# Patient Record
Sex: FEMALE | Race: WHITE | NOT HISPANIC OR LATINO | Employment: OTHER | URBAN - METROPOLITAN AREA
[De-identification: names, ages, dates, MRNs, and addresses within clinical notes are randomized per-mention and may not be internally consistent; named-entity substitution may affect disease eponyms.]

---

## 2022-04-20 ENCOUNTER — HOSPITAL ENCOUNTER (INPATIENT)
Facility: MEDICAL CENTER | Age: 52
LOS: 4 days | DRG: 175 | End: 2022-04-24
Attending: EMERGENCY MEDICINE | Admitting: STUDENT IN AN ORGANIZED HEALTH CARE EDUCATION/TRAINING PROGRAM
Payer: MEDICAID

## 2022-04-20 ENCOUNTER — APPOINTMENT (OUTPATIENT)
Dept: RADIOLOGY | Facility: MEDICAL CENTER | Age: 52
DRG: 175 | End: 2022-04-20
Attending: EMERGENCY MEDICINE
Payer: MEDICAID

## 2022-04-20 DIAGNOSIS — R09.02 HYPOXEMIA: ICD-10-CM

## 2022-04-20 DIAGNOSIS — J96.01 ACUTE RESPIRATORY FAILURE WITH HYPOXIA (HCC): ICD-10-CM

## 2022-04-20 DIAGNOSIS — I26.94 MULTIPLE SUBSEGMENTAL PULMONARY EMBOLI WITHOUT ACUTE COR PULMONALE (HCC): ICD-10-CM

## 2022-04-20 DIAGNOSIS — I26.99 PULMONARY EMBOLISM AND INFARCTION (HCC): ICD-10-CM

## 2022-04-20 PROBLEM — E80.6 HYPERBILIRUBINEMIA: Status: ACTIVE | Noted: 2022-04-20

## 2022-04-20 PROBLEM — K74.60 CIRRHOSIS (HCC): Status: ACTIVE | Noted: 2022-04-20

## 2022-04-20 PROBLEM — J45.909 ASTHMA: Status: ACTIVE | Noted: 2022-04-20

## 2022-04-20 PROBLEM — E03.9 HYPOTHYROIDISM: Status: ACTIVE | Noted: 2022-04-20

## 2022-04-20 LAB
ALBUMIN SERPL BCP-MCNC: 3.5 G/DL (ref 3.2–4.9)
ALBUMIN/GLOB SERPL: 0.9 G/DL
ALP SERPL-CCNC: 152 U/L (ref 30–99)
ALT SERPL-CCNC: 21 U/L (ref 2–50)
ANION GAP SERPL CALC-SCNC: 13 MMOL/L (ref 7–16)
ANISOCYTOSIS BLD QL SMEAR: ABNORMAL
APTT PPP: 29.7 SEC (ref 24.7–36)
AST SERPL-CCNC: 27 U/L (ref 12–45)
BASOPHILS # BLD AUTO: 0.6 % (ref 0–1.8)
BASOPHILS # BLD: 0.04 K/UL (ref 0–0.12)
BILIRUB SERPL-MCNC: 2.7 MG/DL (ref 0.1–1.5)
BUN SERPL-MCNC: 28 MG/DL (ref 8–22)
BURR CELLS BLD QL SMEAR: NORMAL
CALCIUM SERPL-MCNC: 8.8 MG/DL (ref 8.5–10.5)
CHLORIDE SERPL-SCNC: 98 MMOL/L (ref 96–112)
CO2 SERPL-SCNC: 20 MMOL/L (ref 20–33)
COMMENT 1642: NORMAL
CREAT SERPL-MCNC: 0.96 MG/DL (ref 0.5–1.4)
D DIMER PPP IA.FEU-MCNC: 12.56 UG/ML (FEU) (ref 0–0.5)
EKG IMPRESSION: NORMAL
EOSINOPHIL # BLD AUTO: 0.01 K/UL (ref 0–0.51)
EOSINOPHIL NFR BLD: 0.1 % (ref 0–6.9)
ERYTHROCYTE [DISTWIDTH] IN BLOOD BY AUTOMATED COUNT: 58.2 FL (ref 35.9–50)
GFR SERPLBLD CREATININE-BSD FMLA CKD-EPI: 71 ML/MIN/1.73 M 2
GLOBULIN SER CALC-MCNC: 4 G/DL (ref 1.9–3.5)
GLUCOSE SERPL-MCNC: 111 MG/DL (ref 65–99)
HCT VFR BLD AUTO: 43.1 % (ref 37–47)
HGB BLD-MCNC: 12.3 G/DL (ref 12–16)
HYPOCHROMIA BLD QL SMEAR: ABNORMAL
IMM GRANULOCYTES # BLD AUTO: 0.03 K/UL (ref 0–0.11)
IMM GRANULOCYTES NFR BLD AUTO: 0.4 % (ref 0–0.9)
INR PPP: 1.59 (ref 0.87–1.13)
LYMPHOCYTES # BLD AUTO: 0.89 K/UL (ref 1–4.8)
LYMPHOCYTES NFR BLD: 13 % (ref 22–41)
MACROCYTES BLD QL SMEAR: ABNORMAL
MAGNESIUM SERPL-MCNC: 1.6 MG/DL (ref 1.5–2.5)
MCH RBC QN AUTO: 21 PG (ref 27–33)
MCHC RBC AUTO-ENTMCNC: 28.5 G/DL (ref 33.6–35)
MCV RBC AUTO: 73.5 FL (ref 81.4–97.8)
MONOCYTES # BLD AUTO: 0.75 K/UL (ref 0–0.85)
MONOCYTES NFR BLD AUTO: 10.9 % (ref 0–13.4)
MORPHOLOGY BLD-IMP: NORMAL
NEUTROPHILS # BLD AUTO: 5.14 K/UL (ref 2–7.15)
NEUTROPHILS NFR BLD: 75 % (ref 44–72)
NRBC # BLD AUTO: 0.04 K/UL
NRBC BLD-RTO: 0.6 /100 WBC
NT-PROBNP SERPL IA-MCNC: 3470 PG/ML (ref 0–125)
OVALOCYTES BLD QL SMEAR: NORMAL
PLATELET # BLD AUTO: 189 K/UL (ref 164–446)
PLATELET BLD QL SMEAR: NORMAL
POIKILOCYTOSIS BLD QL SMEAR: NORMAL
POLYCHROMASIA BLD QL SMEAR: NORMAL
POTASSIUM SERPL-SCNC: 4.4 MMOL/L (ref 3.6–5.5)
PROCALCITONIN SERPL-MCNC: 0.14 NG/ML
PROT SERPL-MCNC: 7.5 G/DL (ref 6–8.2)
PROTHROMBIN TIME: 18.5 SEC (ref 12–14.6)
RBC # BLD AUTO: 5.86 M/UL (ref 4.2–5.4)
RBC BLD AUTO: PRESENT
SODIUM SERPL-SCNC: 131 MMOL/L (ref 135–145)
TROPONIN T SERPL-MCNC: 25 NG/L (ref 6–19)
UFH PPP CHRO-ACNC: <0.1 IU/ML
WBC # BLD AUTO: 6.9 K/UL (ref 4.8–10.8)

## 2022-04-20 PROCEDURE — 770020 HCHG ROOM/CARE - TELE (206)

## 2022-04-20 PROCEDURE — 96368 THER/DIAG CONCURRENT INF: CPT

## 2022-04-20 PROCEDURE — 700111 HCHG RX REV CODE 636 W/ 250 OVERRIDE (IP): Performed by: STUDENT IN AN ORGANIZED HEALTH CARE EDUCATION/TRAINING PROGRAM

## 2022-04-20 PROCEDURE — 93005 ELECTROCARDIOGRAM TRACING: CPT

## 2022-04-20 PROCEDURE — 700117 HCHG RX CONTRAST REV CODE 255: Performed by: EMERGENCY MEDICINE

## 2022-04-20 PROCEDURE — A9270 NON-COVERED ITEM OR SERVICE: HCPCS | Performed by: STUDENT IN AN ORGANIZED HEALTH CARE EDUCATION/TRAINING PROGRAM

## 2022-04-20 PROCEDURE — 93005 ELECTROCARDIOGRAM TRACING: CPT | Performed by: EMERGENCY MEDICINE

## 2022-04-20 PROCEDURE — 96365 THER/PROPH/DIAG IV INF INIT: CPT

## 2022-04-20 PROCEDURE — 71045 X-RAY EXAM CHEST 1 VIEW: CPT

## 2022-04-20 PROCEDURE — 71275 CT ANGIOGRAPHY CHEST: CPT

## 2022-04-20 PROCEDURE — 85025 COMPLETE CBC W/AUTO DIFF WBC: CPT

## 2022-04-20 PROCEDURE — 700102 HCHG RX REV CODE 250 W/ 637 OVERRIDE(OP): Performed by: STUDENT IN AN ORGANIZED HEALTH CARE EDUCATION/TRAINING PROGRAM

## 2022-04-20 PROCEDURE — 99223 1ST HOSP IP/OBS HIGH 75: CPT | Performed by: STUDENT IN AN ORGANIZED HEALTH CARE EDUCATION/TRAINING PROGRAM

## 2022-04-20 PROCEDURE — 85610 PROTHROMBIN TIME: CPT

## 2022-04-20 PROCEDURE — 83880 ASSAY OF NATRIURETIC PEPTIDE: CPT

## 2022-04-20 PROCEDURE — 96375 TX/PRO/DX INJ NEW DRUG ADDON: CPT

## 2022-04-20 PROCEDURE — 84145 PROCALCITONIN (PCT): CPT

## 2022-04-20 PROCEDURE — 36415 COLL VENOUS BLD VENIPUNCTURE: CPT

## 2022-04-20 PROCEDURE — 85379 FIBRIN DEGRADATION QUANT: CPT

## 2022-04-20 PROCEDURE — 85730 THROMBOPLASTIN TIME PARTIAL: CPT

## 2022-04-20 PROCEDURE — 74176 CT ABD & PELVIS W/O CONTRAST: CPT

## 2022-04-20 PROCEDURE — 85520 HEPARIN ASSAY: CPT

## 2022-04-20 PROCEDURE — 83735 ASSAY OF MAGNESIUM: CPT

## 2022-04-20 PROCEDURE — 99285 EMERGENCY DEPT VISIT HI MDM: CPT

## 2022-04-20 PROCEDURE — 80053 COMPREHEN METABOLIC PANEL: CPT

## 2022-04-20 PROCEDURE — 84484 ASSAY OF TROPONIN QUANT: CPT

## 2022-04-20 PROCEDURE — 82962 GLUCOSE BLOOD TEST: CPT

## 2022-04-20 RX ORDER — HEPARIN SODIUM 1000 [USP'U]/ML
40 INJECTION, SOLUTION INTRAVENOUS; SUBCUTANEOUS PRN
Status: DISCONTINUED | OUTPATIENT
Start: 2022-04-20 | End: 2022-04-20

## 2022-04-20 RX ORDER — HYDRALAZINE HYDROCHLORIDE 20 MG/ML
10 INJECTION INTRAMUSCULAR; INTRAVENOUS EVERY 4 HOURS PRN
Status: DISCONTINUED | OUTPATIENT
Start: 2022-04-20 | End: 2022-04-24 | Stop reason: HOSPADM

## 2022-04-20 RX ORDER — ACETAMINOPHEN 325 MG/1
650 TABLET ORAL EVERY 6 HOURS PRN
Status: DISCONTINUED | OUTPATIENT
Start: 2022-04-20 | End: 2022-04-24 | Stop reason: HOSPADM

## 2022-04-20 RX ORDER — GUAIFENESIN/DEXTROMETHORPHAN 100-10MG/5
10 SYRUP ORAL EVERY 6 HOURS PRN
Status: DISCONTINUED | OUTPATIENT
Start: 2022-04-20 | End: 2022-04-24 | Stop reason: HOSPADM

## 2022-04-20 RX ORDER — ONDANSETRON 4 MG/1
4 TABLET, ORALLY DISINTEGRATING ORAL EVERY 4 HOURS PRN
Status: DISCONTINUED | OUTPATIENT
Start: 2022-04-20 | End: 2022-04-24 | Stop reason: HOSPADM

## 2022-04-20 RX ORDER — PROMETHAZINE HYDROCHLORIDE 25 MG/1
12.5-25 TABLET ORAL EVERY 4 HOURS PRN
Status: DISCONTINUED | OUTPATIENT
Start: 2022-04-20 | End: 2022-04-24 | Stop reason: HOSPADM

## 2022-04-20 RX ORDER — HEPARIN SODIUM 1000 [USP'U]/ML
80 INJECTION, SOLUTION INTRAVENOUS; SUBCUTANEOUS ONCE
Status: COMPLETED | OUTPATIENT
Start: 2022-04-20 | End: 2022-04-20

## 2022-04-20 RX ORDER — HEPARIN SODIUM 5000 [USP'U]/100ML
0-30 INJECTION, SOLUTION INTRAVENOUS CONTINUOUS
Status: DISCONTINUED | OUTPATIENT
Start: 2022-04-20 | End: 2022-04-20

## 2022-04-20 RX ORDER — MAGNESIUM SULFATE HEPTAHYDRATE 40 MG/ML
2 INJECTION, SOLUTION INTRAVENOUS ONCE
Status: COMPLETED | OUTPATIENT
Start: 2022-04-20 | End: 2022-04-21

## 2022-04-20 RX ORDER — DEXTROSE MONOHYDRATE 25 G/50ML
25 INJECTION, SOLUTION INTRAVENOUS
Status: DISCONTINUED | OUTPATIENT
Start: 2022-04-20 | End: 2022-04-21

## 2022-04-20 RX ORDER — FUROSEMIDE 10 MG/ML
40 INJECTION INTRAMUSCULAR; INTRAVENOUS
Status: DISCONTINUED | OUTPATIENT
Start: 2022-04-21 | End: 2022-04-24 | Stop reason: HOSPADM

## 2022-04-20 RX ORDER — AMOXICILLIN 250 MG
2 CAPSULE ORAL 2 TIMES DAILY
Status: DISCONTINUED | OUTPATIENT
Start: 2022-04-20 | End: 2022-04-24 | Stop reason: HOSPADM

## 2022-04-20 RX ORDER — HEPARIN SODIUM 1000 [USP'U]/ML
40 INJECTION, SOLUTION INTRAVENOUS; SUBCUTANEOUS PRN
Status: DISCONTINUED | OUTPATIENT
Start: 2022-04-20 | End: 2022-04-21

## 2022-04-20 RX ORDER — HEPARIN SODIUM 5000 [USP'U]/100ML
0-30 INJECTION, SOLUTION INTRAVENOUS CONTINUOUS
Status: DISCONTINUED | OUTPATIENT
Start: 2022-04-20 | End: 2022-04-21

## 2022-04-20 RX ORDER — POLYETHYLENE GLYCOL 3350 17 G/17G
1 POWDER, FOR SOLUTION ORAL
Status: DISCONTINUED | OUTPATIENT
Start: 2022-04-20 | End: 2022-04-24 | Stop reason: HOSPADM

## 2022-04-20 RX ORDER — PROCHLORPERAZINE EDISYLATE 5 MG/ML
5-10 INJECTION INTRAMUSCULAR; INTRAVENOUS EVERY 4 HOURS PRN
Status: DISCONTINUED | OUTPATIENT
Start: 2022-04-20 | End: 2022-04-24 | Stop reason: HOSPADM

## 2022-04-20 RX ORDER — TRAMADOL HYDROCHLORIDE 50 MG/1
50 TABLET ORAL EVERY 6 HOURS PRN
Status: DISCONTINUED | OUTPATIENT
Start: 2022-04-20 | End: 2022-04-21

## 2022-04-20 RX ORDER — ONDANSETRON 2 MG/ML
4 INJECTION INTRAMUSCULAR; INTRAVENOUS EVERY 4 HOURS PRN
Status: DISCONTINUED | OUTPATIENT
Start: 2022-04-20 | End: 2022-04-24 | Stop reason: HOSPADM

## 2022-04-20 RX ORDER — LEVOTHYROXINE SODIUM 0.05 MG/1
50 TABLET ORAL DAILY
Status: DISCONTINUED | OUTPATIENT
Start: 2022-04-21 | End: 2022-04-20

## 2022-04-20 RX ORDER — IPRATROPIUM BROMIDE AND ALBUTEROL SULFATE 2.5; .5 MG/3ML; MG/3ML
3 SOLUTION RESPIRATORY (INHALATION)
Status: DISCONTINUED | OUTPATIENT
Start: 2022-04-20 | End: 2022-04-24 | Stop reason: HOSPADM

## 2022-04-20 RX ORDER — ACETAMINOPHEN 500 MG
500-1000 TABLET ORAL EVERY 6 HOURS PRN
Status: ON HOLD | COMMUNITY
End: 2022-04-24

## 2022-04-20 RX ORDER — BISACODYL 10 MG
10 SUPPOSITORY, RECTAL RECTAL
Status: DISCONTINUED | OUTPATIENT
Start: 2022-04-20 | End: 2022-04-24 | Stop reason: HOSPADM

## 2022-04-20 RX ORDER — PROMETHAZINE HYDROCHLORIDE 25 MG/1
12.5-25 SUPPOSITORY RECTAL EVERY 4 HOURS PRN
Status: DISCONTINUED | OUTPATIENT
Start: 2022-04-20 | End: 2022-04-24 | Stop reason: HOSPADM

## 2022-04-20 RX ORDER — ATORVASTATIN CALCIUM 20 MG/1
20 TABLET, FILM COATED ORAL EVERY EVENING
Status: DISCONTINUED | OUTPATIENT
Start: 2022-04-20 | End: 2022-04-24 | Stop reason: HOSPADM

## 2022-04-20 RX ADMIN — HEPARIN SODIUM 7000 UNITS: 1000 INJECTION, SOLUTION INTRAVENOUS; SUBCUTANEOUS at 21:59

## 2022-04-20 RX ADMIN — HEPARIN SODIUM 18 UNITS/KG/HR: 5000 INJECTION, SOLUTION INTRAVENOUS at 21:58

## 2022-04-20 RX ADMIN — MAGNESIUM SULFATE HEPTAHYDRATE 2 G: 40 INJECTION, SOLUTION INTRAVENOUS at 22:03

## 2022-04-20 RX ADMIN — IOHEXOL 50 ML: 350 INJECTION, SOLUTION INTRAVENOUS at 20:45

## 2022-04-20 RX ADMIN — ATORVASTATIN CALCIUM 20 MG: 20 TABLET, FILM COATED ORAL at 23:14

## 2022-04-20 ASSESSMENT — ENCOUNTER SYMPTOMS
COUGH: 0
NECK PAIN: 0
SPUTUM PRODUCTION: 0
VOMITING: 0
SPEECH CHANGE: 0
BLURRED VISION: 0
FOCAL WEAKNESS: 0
ABDOMINAL PAIN: 0
HEARTBURN: 0
HEADACHES: 0
FALLS: 0
SHORTNESS OF BREATH: 1
PALPITATIONS: 0
CHILLS: 0
BRUISES/BLEEDS EASILY: 0
DEPRESSION: 0
ROS GI COMMENTS: ABDOMINAL BLOATING
DIZZINESS: 0
DOUBLE VISION: 0
FEVER: 0
NAUSEA: 0
FLANK PAIN: 0
MYALGIAS: 0

## 2022-04-20 ASSESSMENT — LIFESTYLE VARIABLES
EVER FELT BAD OR GUILTY ABOUT YOUR DRINKING: NO
CONSUMPTION TOTAL: NEGATIVE
HAVE PEOPLE ANNOYED YOU BY CRITICIZING YOUR DRINKING: NO
TOTAL SCORE: 0
HOW MANY TIMES IN THE PAST YEAR HAVE YOU HAD 5 OR MORE DRINKS IN A DAY: 0
ALCOHOL_USE: YES
TOTAL SCORE: 0
EVER HAD A DRINK FIRST THING IN THE MORNING TO STEADY YOUR NERVES TO GET RID OF A HANGOVER: NO
HAVE YOU EVER FELT YOU SHOULD CUT DOWN ON YOUR DRINKING: NO
AVERAGE NUMBER OF DAYS PER WEEK YOU HAVE A DRINK CONTAINING ALCOHOL: 0
TOTAL SCORE: 0
SUBSTANCE_ABUSE: 0
DOES PATIENT WANT TO STOP DRINKING: NO
ON A TYPICAL DAY WHEN YOU DRINK ALCOHOL HOW MANY DRINKS DO YOU HAVE: 2

## 2022-04-20 ASSESSMENT — COGNITIVE AND FUNCTIONAL STATUS - GENERAL
DRESSING REGULAR LOWER BODY CLOTHING: A LITTLE
SUGGESTED CMS G CODE MODIFIER DAILY ACTIVITY: CJ
DAILY ACTIVITIY SCORE: 22
MOBILITY SCORE: 23
MOVING TO AND FROM BED TO CHAIR: A LITTLE
HELP NEEDED FOR BATHING: A LITTLE
SUGGESTED CMS G CODE MODIFIER MOBILITY: CI

## 2022-04-20 ASSESSMENT — PAIN DESCRIPTION - PAIN TYPE: TYPE: ACUTE PAIN;CHRONIC PAIN

## 2022-04-20 ASSESSMENT — FIBROSIS 4 INDEX: FIB4 SCORE: 1.62

## 2022-04-21 ENCOUNTER — APPOINTMENT (OUTPATIENT)
Dept: CARDIOLOGY | Facility: MEDICAL CENTER | Age: 52
DRG: 175 | End: 2022-04-21
Attending: INTERNAL MEDICINE
Payer: MEDICAID

## 2022-04-21 PROBLEM — K76.0 HEPATIC STEATOSIS: Status: ACTIVE | Noted: 2022-04-21

## 2022-04-21 PROBLEM — K74.60 CIRRHOSIS (HCC): Status: RESOLVED | Noted: 2022-04-20 | Resolved: 2022-04-21

## 2022-04-21 PROBLEM — K74.00 HEPATIC FIBROSIS: Status: ACTIVE | Noted: 2022-04-21

## 2022-04-21 PROBLEM — K76.0 HEPATIC STEATOSIS: Status: RESOLVED | Noted: 2022-04-21 | Resolved: 2022-04-21

## 2022-04-21 PROBLEM — I50.9 DECOMPENSATED HEART FAILURE (HCC): Status: ACTIVE | Noted: 2022-04-21

## 2022-04-21 LAB
ALBUMIN SERPL BCP-MCNC: 3.5 G/DL (ref 3.2–4.9)
ALBUMIN/GLOB SERPL: 0.9 G/DL
ALP SERPL-CCNC: 149 U/L (ref 30–99)
ALT SERPL-CCNC: 20 U/L (ref 2–50)
ANION GAP SERPL CALC-SCNC: 14 MMOL/L (ref 7–16)
AST SERPL-CCNC: 26 U/L (ref 12–45)
BASOPHILS # BLD AUTO: 0.2 % (ref 0–1.8)
BASOPHILS # BLD: 0.02 K/UL (ref 0–0.12)
BILIRUB CONJ SERPL-MCNC: 1.3 MG/DL (ref 0.1–0.5)
BILIRUB SERPL-MCNC: 2.5 MG/DL (ref 0.1–1.5)
BUN SERPL-MCNC: 30 MG/DL (ref 8–22)
CALCIUM SERPL-MCNC: 8.6 MG/DL (ref 8.5–10.5)
CHLORIDE SERPL-SCNC: 101 MMOL/L (ref 96–112)
CHOLEST SERPL-MCNC: 64 MG/DL (ref 100–199)
CO2 SERPL-SCNC: 20 MMOL/L (ref 20–33)
CREAT SERPL-MCNC: 1.02 MG/DL (ref 0.5–1.4)
EOSINOPHIL # BLD AUTO: 0.01 K/UL (ref 0–0.51)
EOSINOPHIL NFR BLD: 0.1 % (ref 0–6.9)
ERYTHROCYTE [DISTWIDTH] IN BLOOD BY AUTOMATED COUNT: 56.7 FL (ref 35.9–50)
EST. AVERAGE GLUCOSE BLD GHB EST-MCNC: 117 MG/DL
GFR SERPLBLD CREATININE-BSD FMLA CKD-EPI: 66 ML/MIN/1.73 M 2
GLOBULIN SER CALC-MCNC: 3.8 G/DL (ref 1.9–3.5)
GLUCOSE BLD STRIP.AUTO-MCNC: 111 MG/DL (ref 65–99)
GLUCOSE BLD STRIP.AUTO-MCNC: 99 MG/DL (ref 65–99)
GLUCOSE SERPL-MCNC: 121 MG/DL (ref 65–99)
HBA1C MFR BLD: 5.7 % (ref 4–5.6)
HCT VFR BLD AUTO: 42.1 % (ref 37–47)
HDLC SERPL-MCNC: 19 MG/DL
HGB BLD-MCNC: 12.3 G/DL (ref 12–16)
IMM GRANULOCYTES # BLD AUTO: 0.02 K/UL (ref 0–0.11)
IMM GRANULOCYTES NFR BLD AUTO: 0.2 % (ref 0–0.9)
LDLC SERPL CALC-MCNC: 29 MG/DL
LV EJECT FRACT  99904: 55
LV EJECT FRACT MOD 2C 99903: 56.82
LV EJECT FRACT MOD 4C 99902: 59.2
LV EJECT FRACT MOD BP 99901: 60.74
LYMPHOCYTES # BLD AUTO: 1.17 K/UL (ref 1–4.8)
LYMPHOCYTES NFR BLD: 14.1 % (ref 22–41)
MAGNESIUM SERPL-MCNC: 1.8 MG/DL (ref 1.5–2.5)
MCH RBC QN AUTO: 21.2 PG (ref 27–33)
MCHC RBC AUTO-ENTMCNC: 29.2 G/DL (ref 33.6–35)
MCV RBC AUTO: 72.7 FL (ref 81.4–97.8)
MONOCYTES # BLD AUTO: 0.96 K/UL (ref 0–0.85)
MONOCYTES NFR BLD AUTO: 11.6 % (ref 0–13.4)
NEUTROPHILS # BLD AUTO: 6.09 K/UL (ref 2–7.15)
NEUTROPHILS NFR BLD: 73.8 % (ref 44–72)
NRBC # BLD AUTO: 0.03 K/UL
NRBC BLD-RTO: 0.4 /100 WBC
PHOSPHATE SERPL-MCNC: 3.9 MG/DL (ref 2.5–4.5)
PLATELET # BLD AUTO: 142 K/UL (ref 164–446)
PLATELET # BLD AUTO: 151 K/UL (ref 164–446)
POTASSIUM SERPL-SCNC: 4.2 MMOL/L (ref 3.6–5.5)
PROT SERPL-MCNC: 7.3 G/DL (ref 6–8.2)
RBC # BLD AUTO: 5.79 M/UL (ref 4.2–5.4)
SODIUM SERPL-SCNC: 135 MMOL/L (ref 135–145)
TRIGL SERPL-MCNC: 78 MG/DL (ref 0–149)
TSH SERPL DL<=0.005 MIU/L-ACNC: 3.67 UIU/ML (ref 0.38–5.33)
UFH PPP CHRO-ACNC: 0.18 IU/ML
UFH PPP CHRO-ACNC: 0.41 IU/ML
UFH PPP CHRO-ACNC: 0.51 IU/ML
UFH PPP CHRO-ACNC: 0.51 IU/ML
WBC # BLD AUTO: 8.3 K/UL (ref 4.8–10.8)

## 2022-04-21 PROCEDURE — 84450 TRANSFERASE (AST) (SGOT): CPT

## 2022-04-21 PROCEDURE — 83036 HEMOGLOBIN GLYCOSYLATED A1C: CPT

## 2022-04-21 PROCEDURE — 93306 TTE W/DOPPLER COMPLETE: CPT

## 2022-04-21 PROCEDURE — 84443 ASSAY THYROID STIM HORMONE: CPT

## 2022-04-21 PROCEDURE — 700111 HCHG RX REV CODE 636 W/ 250 OVERRIDE (IP): Performed by: STUDENT IN AN ORGANIZED HEALTH CARE EDUCATION/TRAINING PROGRAM

## 2022-04-21 PROCEDURE — 85025 COMPLETE CBC W/AUTO DIFF WBC: CPT

## 2022-04-21 PROCEDURE — 99232 SBSQ HOSP IP/OBS MODERATE 35: CPT | Mod: GC | Performed by: INTERNAL MEDICINE

## 2022-04-21 PROCEDURE — RXMED WILLOW AMBULATORY MEDICATION CHARGE: Performed by: STUDENT IN AN ORGANIZED HEALTH CARE EDUCATION/TRAINING PROGRAM

## 2022-04-21 PROCEDURE — 80053 COMPREHEN METABOLIC PANEL: CPT

## 2022-04-21 PROCEDURE — 700102 HCHG RX REV CODE 250 W/ 637 OVERRIDE(OP): Performed by: STUDENT IN AN ORGANIZED HEALTH CARE EDUCATION/TRAINING PROGRAM

## 2022-04-21 PROCEDURE — 83883 ASSAY NEPHELOMETRY NOT SPEC: CPT

## 2022-04-21 PROCEDURE — A9270 NON-COVERED ITEM OR SERVICE: HCPCS | Performed by: STUDENT IN AN ORGANIZED HEALTH CARE EDUCATION/TRAINING PROGRAM

## 2022-04-21 PROCEDURE — 770020 HCHG ROOM/CARE - TELE (206)

## 2022-04-21 PROCEDURE — 83735 ASSAY OF MAGNESIUM: CPT

## 2022-04-21 PROCEDURE — 84460 ALANINE AMINO (ALT) (SGPT): CPT

## 2022-04-21 PROCEDURE — 85520 HEPARIN ASSAY: CPT | Mod: 91

## 2022-04-21 PROCEDURE — 36415 COLL VENOUS BLD VENIPUNCTURE: CPT

## 2022-04-21 PROCEDURE — 82977 ASSAY OF GGT: CPT

## 2022-04-21 PROCEDURE — 85049 AUTOMATED PLATELET COUNT: CPT

## 2022-04-21 PROCEDURE — 82248 BILIRUBIN DIRECT: CPT

## 2022-04-21 PROCEDURE — 82962 GLUCOSE BLOOD TEST: CPT

## 2022-04-21 PROCEDURE — 84520 ASSAY OF UREA NITROGEN: CPT

## 2022-04-21 PROCEDURE — 84100 ASSAY OF PHOSPHORUS: CPT

## 2022-04-21 PROCEDURE — 93306 TTE W/DOPPLER COMPLETE: CPT | Mod: 26 | Performed by: INTERNAL MEDICINE

## 2022-04-21 PROCEDURE — 80061 LIPID PANEL: CPT

## 2022-04-21 RX ORDER — RIVAROXABAN 15 MG-20MG
1 KIT ORAL DAILY
Qty: 51 EACH | Refills: 0 | Status: SHIPPED | OUTPATIENT
Start: 2022-04-21

## 2022-04-21 RX ORDER — ECHINACEA PURPUREA EXTRACT 125 MG
2 TABLET ORAL
Status: DISCONTINUED | OUTPATIENT
Start: 2022-04-21 | End: 2022-04-24 | Stop reason: HOSPADM

## 2022-04-21 RX ORDER — QUETIAPINE FUMARATE 25 MG/1
25 TABLET, FILM COATED ORAL NIGHTLY PRN
Status: DISCONTINUED | OUTPATIENT
Start: 2022-04-21 | End: 2022-04-24 | Stop reason: HOSPADM

## 2022-04-21 RX ADMIN — SENNOSIDES AND DOCUSATE SODIUM 2 TABLET: 50; 8.6 TABLET ORAL at 16:45

## 2022-04-21 RX ADMIN — FUROSEMIDE 40 MG: 10 INJECTION, SOLUTION INTRAMUSCULAR; INTRAVENOUS at 05:27

## 2022-04-21 RX ADMIN — RIVAROXABAN 15 MG: 15 TABLET, FILM COATED ORAL at 16:45

## 2022-04-21 RX ADMIN — HEPARIN SODIUM 3500 UNITS: 1000 INJECTION, SOLUTION INTRAVENOUS; SUBCUTANEOUS at 06:21

## 2022-04-21 RX ADMIN — HEPARIN SODIUM 20 UNITS/KG/HR: 5000 INJECTION, SOLUTION INTRAVENOUS at 13:02

## 2022-04-21 RX ADMIN — ACETAMINOPHEN 650 MG: 325 TABLET, FILM COATED ORAL at 08:37

## 2022-04-21 RX ADMIN — QUETIAPINE FUMARATE 25 MG: 25 TABLET ORAL at 22:44

## 2022-04-21 RX ADMIN — ATORVASTATIN CALCIUM 20 MG: 20 TABLET, FILM COATED ORAL at 16:44

## 2022-04-21 ASSESSMENT — ENCOUNTER SYMPTOMS
SHORTNESS OF BREATH: 1
CHILLS: 0
WHEEZING: 0
DOUBLE VISION: 0
WEIGHT LOSS: 0
FEVER: 1
HEMOPTYSIS: 0
BLURRED VISION: 0
EYES NEGATIVE: 1
MUSCULOSKELETAL NEGATIVE: 1
COUGH: 1
ABDOMINAL PAIN: 0
ORTHOPNEA: 1
PALPITATIONS: 0
ABDOMINAL PAIN: 1
VOMITING: 0
BLOOD IN STOOL: 0
BRUISES/BLEEDS EASILY: 0
PSYCHIATRIC NEGATIVE: 1
COUGH: 0
HEADACHES: 1
SPUTUM PRODUCTION: 0
NAUSEA: 0
FEVER: 0

## 2022-04-21 ASSESSMENT — PAIN DESCRIPTION - PAIN TYPE
TYPE: ACUTE PAIN
TYPE: ACUTE PAIN

## 2022-04-21 NOTE — ED TRIAGE NOTES
"Chief Complaint   Patient presents with   • Shortness of Breath     X 'few months', hx COPD but SOB worse now, pt has rx for home O2 but does not have any r/t insurance, denies cardiac hx or chest pain, states chronic cough with minimal clear phlegm, denies fevers   • Abdominal Swelling     Pt states abd distension X 3-4 months, denies hx of such, denies liver hx, states some mild constipation, post-menopausal with tubal ligation, denies urinary or stool changes, intermittent nausea but no vomiting      Pt to triage in WC for above complaints, VSS on 6L NC, pt was 70% on RA at , GCS 15, NAD.    Pt returned to lobby. Educated on triage process and to inform staff of any changes.     /107   Pulse 98   Temp 36.6 °C (97.9 °F) (Temporal)   Resp 18   Ht 1.575 m (5' 2\")   Wt (!) 143 kg (315 lb)   SpO2 100%   BMI 57.61 kg/m²     "

## 2022-04-21 NOTE — ASSESSMENT & PLAN NOTE
Likely decompensated right heart failure due to pulmonary embolism..  CTA suggest pulmonary hypertension and congestive hepatopathy, with orthopnea.  Echo shows severely dilated right ventricle with RVSP of 105 mmHg.  -Cautious diuresis with Lasix due to high RVSP.

## 2022-04-21 NOTE — CARE PLAN
The patient is Watcher - Medium risk of patient condition declining or worsening    Shift Goals  Clinical Goals: Monitor O2 & heparin gtt  Patient Goals: Sleep  Family Goals: CAT    Progress made toward(s) clinical / shift goals:        Problem: Knowledge Deficit - Standard  Goal: Patient and family/care givers will demonstrate understanding of plan of care, disease process/condition, diagnostic tests and medications  Outcome: Progressing  Note: Patient verbally demonstrates understanding of POC and disease process      Problem: Pain - Standard  Goal: Alleviation of pain or a reduction in pain to the patient’s comfort goal  Outcome: Progressing  Note: Patient reports the tylenol given for her headache worked well. Utilizing 0-10 pain scale.

## 2022-04-21 NOTE — ED NOTES
Report from ROB Guerrero. Assumed care of pt, pt sitting up in gurney, oxymask on. Able to speak in complete sentences, ERP at bedside.

## 2022-04-21 NOTE — PROGRESS NOTES
Assumed care of patient, received bedside report from NOC RN. Patient is A&O X 4. Pain 3/10, headache, refer to MAR. Vital signs stable overnight, 5L NC, satting 94%. On tele monitor, SR 99. POC discussed with patient and she verbalized understanding. Call light within reach and fall precautions in place. Bed locked and in lowest position.

## 2022-04-21 NOTE — PROGRESS NOTES
Daily Progress Note:     Date of Service: 4/21/2022  Primary Team: [facility] Team   Attending: SHERMAN Rodrigues   Senior Resident: Dr. Wheeler  Intern: Dr. Salazar  Contact:  [phone]    Chief Complaint:   Shortness of Breath    Subjective: 51 y.o F w/o consistent medical care presented with progressive SOB and abdominal swelling x 2-3 months. On presentation to ED, patient was found to be hypoxic with 77% on RA, evaluation revealed multiple segmental and subsegmental PE, right middle lobe and left lower lobe subsegmental pulmonary blood, asymmetric enlargement of the right ventricle  (4.5cm vs 2.9cm for left ventricle). Also with findings of ascites along the left abdominal wall, hepatomegaly with regular hepatic contour favoring changes of cirrhosis.   Heparin drip and lasix started.     Patient reports improvement in her symptoms this AM, reports that she is frustrated as she was not able to sleep due to discomfort with IV placement and noises in the environment. Patient reports that she has not received any medical care for the past 3-5 years due to non-insured status. She reports history of asthma, previously uses albuterol and supplemental oxygen however, she has been using it for about a year.     Patient reports that her SOB has been progressive over the last 2-3 months. She is unable to walk for more than a few steps, prior was able to walk for several blocks. Reports that she is unable to sleep lying down, although that has been for several years. Patient denies known history of heart failure, blood clot, malignancies. She denies any family history of blood clots. She is a  with [employer] and says that she does not often ambulate between her trips.     Patient denies any OCP use, has one-two drinks/month, denies tobacco or recreational drug use    Consultants/Specialty:    Review of Systems   Constitutional: Negative for chills, fever and weight loss.   Eyes: Negative for blurred vision and  double vision.   Respiratory: Positive for cough and shortness of breath. Negative for hemoptysis, sputum production and wheezing.    Cardiovascular: Positive for orthopnea and leg swelling. Negative for chest pain and palpitations.   Gastrointestinal: Negative for abdominal pain and blood in stool.   Endo/Heme/Allergies: Does not bruise/bleed easily.       Objective Data:     Physical Exam:     Vitals:   Temp:  [36.4 °C (97.6 °F)-36.8 °C (98.3 °F)] 36.4 °C (97.6 °F)  Pulse:  [71-98] 79  Resp:  [16-24] 18  BP: (113-166)/() 128/91  SpO2:  [70 %-100 %] 90 %    Physical Exam  Constitutional:       General: She is not in acute distress.     Appearance: She is obese. She is not ill-appearing.   HENT:      Head: Normocephalic and atraumatic.      Nose: Nose normal. No congestion.      Mouth/Throat:      Mouth: Mucous membranes are moist.   Eyes:      General: No scleral icterus.     Extraocular Movements: Extraocular movements intact.      Pupils: Pupils are equal, round, and reactive to light.   Cardiovascular:      Rate and Rhythm: Normal rate and regular rhythm.      Pulses: Normal pulses.      Heart sounds: Normal heart sounds. No murmur heard.  Pulmonary:      Effort: No respiratory distress.      Comments: Decreased breath sounds in lower lung fields  95% on 5L N/C, desats to 82% when lying flat  Chest:      Chest wall: No tenderness.   Abdominal:      General: There is distension.      Comments: 3+ pitting edema of the abdomen, erythema in the epigastric area, without any wound or discharge  No spider nevi or caput medusae    Musculoskeletal:         General: Normal range of motion.      Cervical back: Normal range of motion and neck supple.   Skin:     General: Skin is warm and dry.   Neurological:      General: No focal deficit present.      Mental Status: She is oriented to person, place, and time.      Cranial Nerves: No cranial nerve deficit.      Motor: No weakness.      Coordination: Coordination  normal.      Gait: Gait normal.   Psychiatric:         Mood and Affect: Mood normal.         Behavior: Behavior normal.       Labs:   Recent Labs     04/20/22 1910 04/21/22  0017   SODIUM 131* 135   POTASSIUM 4.4 4.2   CHLORIDE 98 101   CO2 20 20   GLUCOSE 111* 121*   BUN 28* 30*      Recent Labs     04/20/22 1910 04/21/22  0017 04/21/22  1225   WBC 6.9 8.3  --    RBC 5.86* 5.79*  --    HEMOGLOBIN 12.3 12.3  --    HEMATOCRIT 43.1 42.1  --    MCV 73.5* 72.7*  --    MCH 21.0* 21.2*  --    RDW 58.2* 56.7*  --    PLATELETCT 189 151* 142*   NEUTSPOLYS 75.00* 73.80*  --    LYMPHOCYTES 13.00* 14.10*  --    MONOCYTES 10.90 11.60  --    EOSINOPHILS 0.10 0.10  --    BASOPHILS 0.60 0.20  --    RBCMORPHOLO Present  --   --      TSH 3.670  AST/ALT 27    T.bili 2.7  PT 18.5, INR 1.59, PTT 29.7     Imaging:   CT-CTA CHEST PULMONARY ARTERY W/ RECONS   Final Result         1.  Right lower lobe segmental and subsegmental pulmonary embolus. Right middle lobe and left lower lobe subsegmental pulmonary blood.   2.  Asymmetric enlargement of the right ventricle measuring 4.5 cm versus 2.9 cm for the left ventricle. Could represent component of right ventricular strain.   3.  Enlargement and the main pulmonary artery, consider pulmonary arterial hypertension.   4.  Hazy ground glass pulmonary opacities, appearance suggests edema and/or infiltrate.   5.  Ascites along the left abdominal wall   6.  Hepatomegaly with regular hepatic contour favoring changes of cirrhosis.   7.  Small pericardial effusion   8.  Atherosclerosis and atherosclerotic coronary artery disease.      These findings were discussed with the patient's clinician, Shaan Davila, on 4/20/2022 9:01 PM.      CT-RENAL COLIC EVALUATION(A/P W/O)   Final Result         1.  Hazy groundglass opacities in the bilateral lung bases suggests edema or atypical infiltrates.   2.  Hepatomegaly and irregular hepatic contour favoring changes of cirrhosis.   3.  Scattered mild to  moderate abdominal ascites.   4.  3.5 cm fat-containing umbilical hernia.   5.  Diverticulosis   6.  Subcutaneous fat stranding in the anterior abdominal wall, could represent lymphedema and/or cellulitis.      DX-CHEST-PORTABLE (1 VIEW)   Final Result      Cardiomegaly with diffuse interstitial prominence which may be secondary to edema or infiltrate.      EC-ECHOCARDIOGRAM COMPLETE W/O CONT    (Results Pending)       Problem Representation:     * Pulmonary embolism and infarction (HCC)- (present on admission)  Assessment & Plan  - multiple segmental and sub-segmental PE. DANTE score 2. Subacute to chronic considering her history, presumably from her decreased mobility   - started on heparin drip for anticoagulation, will transition to oral AC today- preference is DOACs, however due to her uninsured status, may need warfarin. Will need at least 3 months of AC, considering evidence of right heart strain, may need repeat imaging to determine extended therapy  - CT with asymmetric enlargement of the right ventricle and enlargement of main pulmonary artery. Echo pending for further evaluation of right heart strain.   - LE Doppler to evaluate for source of clot        Hepatic fibrosis  Assessment & Plan  - Hepatomegaly and irregular hepatic contour favoring changes of cirrhosis on CT imaging. NAFLD fibrosis score 2.00 suggestive of stage 3-4 fibrosis rather than cirrhosis   - will get viral hepatitis panel, LDL for further evaluation    Decompensated heart failure (HCC)  Assessment & Plan  - CTA with suggestion of pulmonary hypertension and congestive hepatopathy, with orthopnea and signs of volume overload   - Echo pending  - continue with IV Lasix 40mg daily, diuresis to euvolemia. Dose adjustment pending output and echo findings    Acute respiratory failure with hypoxia (HCC)  Assessment & Plan  - secondary to PE and decompensated heart failure   - See A&P above   - continue with supplemental O2, tirate to goal O2  >92%     Hypothyroidism  Assessment & Plan  - per patient's report although TSH is WNL, will not start replacement,

## 2022-04-21 NOTE — ASSESSMENT & PLAN NOTE
Likely hepatomegaly due to viral hepatitis versus NAFLD versus congestive hepatopathy.  -Follow viral hepatitis panel.

## 2022-04-21 NOTE — ASSESSMENT & PLAN NOTE
CTA PE: b/l PE  Denies OCP use, smoking, prior VTE nor FH of VTE  Likely provoked - immobilization/Uber  Wean off oxygen as tolerated  F/u echo to evaluate for RV strain  Heparin gtt, transition to DOAC when appropriate

## 2022-04-21 NOTE — DISCHARGE PLANNING
Spoke with Dr Wheeler re pt's need for Xarelto for 3 months. Pt is not able to pay for it. Pt is uninsured.   Called [facility] and they quoted $16 for 90 day supply with approved services.   Approved Services request has been faxed to [facility]. MD will place orders.

## 2022-04-21 NOTE — NON-PROVIDER
Daily Progress Note:     Date of Service: 4/21/2022  Primary Team: [facility] Team   Attending: Shannon Zhu MD  Senior Resident: Dr. Wheeler  Intern: Dr. Echols  Contact:  [phone]    ID:   Renetta is a 51 y.o. female who presented 4/20/2022 with worsening shortness of breath secondary to a pulmonary embolism.    Chief Complaint:   Progressively worsening shortness of breath x3 months    Interval Events:  According to nursing report, overnight the patient's SpO2 did decrease to the high 80s while on 4L NC. After increasing SpO2 to 5L, SpO2 increased to 95%.    Subjective:  Patient reports that she has had a headache because she was not able to get any sleep after her 2 IV lines were inserted. She states that the IV in her left arm is causing irritation when she bends her arm. Her shortness of breath has improved since admission. Her abdominal tenderness has decreased as well. She also denies any fevers, chills, shortness of breath, chest pain, nausea, or vomiting.     Consultants/Specialty:  N/A    Review of Systems:    Review of Systems   Constitutional: Positive for fever and malaise/fatigue. Negative for chills.   Eyes: Negative.    Respiratory: Positive for shortness of breath. Negative for cough.    Cardiovascular: Positive for leg swelling.   Gastrointestinal: Positive for abdominal pain. Negative for nausea and vomiting.   Genitourinary: Negative.    Musculoskeletal: Negative.    Skin: Negative for rash.   Neurological: Positive for headaches.   Endo/Heme/Allergies: Negative.    Psychiatric/Behavioral: Negative.        Objective Data:   Physical Exam:   Vitals:   Temp:  [36.4 °C (97.6 °F)-36.8 °C (98.3 °F)] 36.4 °C (97.6 °F)  Pulse:  [71-98] 79  Resp:  [16-24] 18  BP: (113-166)/() 128/91  SpO2:  [70 %-100 %] 90 %  Physical Exam  Constitutional:       General: She is not in acute distress.     Appearance: Normal appearance. She is not ill-appearing, toxic-appearing or diaphoretic.   HENT:       Head: Normocephalic and atraumatic.      Nose: No congestion or rhinorrhea.      Mouth/Throat:      Pharynx: No oropharyngeal exudate or posterior oropharyngeal erythema.   Eyes:      Extraocular Movements: Extraocular movements intact.      Conjunctiva/sclera: Conjunctivae normal.      Pupils: Pupils are equal, round, and reactive to light.   Cardiovascular:      Rate and Rhythm: Normal rate and regular rhythm.      Pulses: Normal pulses.      Heart sounds: Normal heart sounds.   Pulmonary:      Effort: Pulmonary effort is normal. No respiratory distress.      Comments: Diminished lung sounds at the bases.  Chest:      Chest wall: No tenderness.   Abdominal:      General: Bowel sounds are normal. There is distension.      Tenderness: There is abdominal tenderness.   Musculoskeletal:         General: Normal range of motion.      Cervical back: Normal range of motion.   Skin:     General: Skin is warm and dry.      Capillary Refill: Capillary refill takes less than 2 seconds.      Comments: 1+ pitting edema of the bilateral lower extremities.  Sacral edema also present.    Neurological:      General: No focal deficit present.      Mental Status: She is alert and oriented to person, place, and time. Mental status is at baseline.   Psychiatric:         Mood and Affect: Mood normal.         Behavior: Behavior normal.         Thought Content: Thought content normal.         Judgment: Judgment normal.           Labs:   Recent Labs     04/20/22 1910 04/21/22  0017   WBC 6.9 8.3   RBC 5.86* 5.79*   HEMOGLOBIN 12.3 12.3   HEMATOCRIT 43.1 42.1   MCV 73.5* 72.7*   MCH 21.0* 21.2*   RDW 58.2* 56.7*   PLATELETCT 189 151*   NEUTSPOLYS 75.00* 73.80*   LYMPHOCYTES 13.00* 14.10*   MONOCYTES 10.90 11.60   EOSINOPHILS 0.10 0.10   BASOPHILS 0.60 0.20   RBCMORPHOLO Present  --      Recent Labs     04/20/22 1910 04/21/22  0017   SODIUM 131* 135   POTASSIUM 4.4 4.2   CHLORIDE 98 101   CO2 20 20   GLUCOSE 111* 121*   BUN 28* 30*        Imaging:   CT-CTA CHEST PULMONARY ARTERY W/ RECONS   Final Result         1.  Right lower lobe segmental and subsegmental pulmonary embolus. Right middle lobe and left lower lobe subsegmental pulmonary blood.   2.  Asymmetric enlargement of the right ventricle measuring 4.5 cm versus 2.9 cm for the left ventricle. Could represent component of right ventricular strain.   3.  Enlargement and the main pulmonary artery, consider pulmonary arterial hypertension.   4.  Hazy ground glass pulmonary opacities, appearance suggests edema and/or infiltrate.   5.  Ascites along the left abdominal wall   6.  Hepatomegaly with regular hepatic contour favoring changes of cirrhosis.   7.  Small pericardial effusion   8.  Atherosclerosis and atherosclerotic coronary artery disease.      These findings were discussed with the patient's clinician, Shaan Davila, on 4/20/2022 9:01 PM.      CT-RENAL COLIC EVALUATION(A/P W/O)   Final Result         1.  Hazy groundglass opacities in the bilateral lung bases suggests edema or atypical infiltrates.   2.  Hepatomegaly and irregular hepatic contour favoring changes of cirrhosis.   3.  Scattered mild to moderate abdominal ascites.   4.  3.5 cm fat-containing umbilical hernia.   5.  Diverticulosis   6.  Subcutaneous fat stranding in the anterior abdominal wall, could represent lymphedema and/or cellulitis.      DX-CHEST-PORTABLE (1 VIEW)   Final Result      Cardiomegaly with diffuse interstitial prominence which may be secondary to edema or infiltrate.      EC-ECHOCARDIOGRAM COMPLETE W/O CONT    (Results Pending)       Problem Representation: Renetta Montes is a 51 YOF with a PMHx of COPD and asthma and was admitted on 4/20/22 for shortness of breath secondary to a pulmonary embolism.     1. Shortness of Breath  This problem is acute-subacute in duration. Patient's condition is currently stable. Patient's chest CT revealed a right lower lobe segmental and subsegmental pulmonary  embolus. Patient's clinical condition has improved since admission. Vital signs are stable, patient's SpO2 at 94% on 5L NC. The patient is currently on IV heparin.  Plan for this issue will be:  1. Transition to direct oral anticoagulation. Patient's clinical characteristics make her a suitable candidate for apixaban. Patient will need to continue DOAC therapy for at least 3 months.   2. Wean patient off of supplemental oxygen until she can tolerate SpO2 of 88%-92% on room air.    3. Continue with SCD therapy until patient is ambulatory.  4. Remove peripheral IV of the RUE.  5. Patient will eventually need to follow up with primary care for further monitoring once she is ready for discharge.    2. Headache  This problem is acute in duration. Patient's headache is likely due to the fact that she has been unable to sleep since admission.   This issue is resolving.  Plan includes:  1. Remove IV once heparin drip is complete.  2. Reposition the other IV to another location.  3. Tylenol PRN for pain.    3. Leg Swelling  This problem is acute-subacute in duration. CT of the chest revealed that the patient has asymmetric enlargement of the right ventricle. Patient also had a BNP of 3470 while in the ED.  Plan for this issue includes:  1. Echocardiogram for any cardiac abnormalities.  2. Lasix 40 mg.  3. SCD therapy.    4. Abdominal Swelling  This problem is chronic in duration as patient notes she has been having tenderness for 3-4 months. CT of the abdomen revealed hepatomegaly along with mild ascites. Patient denies any long term alcohol use. For this reason, patient may have steatosis or steatohepatitis. Otherwise, it could be a related to right-sided HF.   Plan for this issue includes getting a hepatitis panel.     5. Asthma  This problem is chronic. Patient states that she used to have albuterol, but has since run out.   Plan includes nebulizer therapy as needed and eventual follow up with primary care.    6.  Hyperlipidemia  This problem is chronic. Patient states that she used to take atorvastatin, but has since run out.   Plan includes:  1. Atorvastatin 20 mg QD  2. Follow up with primary care once patient is suitable for discharge.     7. Diabetes Mellitus  This problem is chronic. Patient states that she used to take glyburide, but has since run out. Most recent A1C on 4/21/22 was 5.7  Plan includes:  Patient will need refill of medications with eventual follow up with PCP.

## 2022-04-21 NOTE — ED PROVIDER NOTES
ED Provider Note    ER PROVIDER NOTE        CHIEF COMPLAINT  Chief Complaint   Patient presents with   • Shortness of Breath     X 'few months', hx COPD but SOB worse now, pt has rx for home O2 but does not have any r/t insurance, denies cardiac hx or chest pain, states chronic cough with minimal clear phlegm, denies fevers   • Abdominal Swelling     Pt states abd distension X 3-4 months, denies hx of such, denies liver hx, states some mild constipation, post-menopausal with tubal ligation, denies urinary or stool changes, intermittent nausea but no vomiting        HPI  Renetta Montes is a 51 y.o. female who presents to the emergency department complaining of progressive shortness of breath.  Patient reports that she has had increased shortness of breath of the last few months.  She does have a history of COPD but feels this is worsening.  She does have a prescription for home O2 but has been off this over the last 2 years, states her oxygen is usually around 90 if she is not walking around.  She reports no chest pain, no fevers or chills.  She reports a chronic cough that is primarily dry.  No new leg pain or swelling although she does occasionally get some cramps in her legs.  She is also reporting some increased abdominal distention over the last 3 to 4 months.  No real abdominal pain.  She has had no nausea or vomiting.  She has some intermittent constipation.    REVIEW OF SYSTEMS  Pertinent positives include shortness of breath. Pertinent negatives include no fever. See HPI for details. All other systems reviewed and are negative.    PAST MEDICAL HISTORY   has a past medical history of Diabetes, Dyslipidemia, Leg swelling (chronic), and Obesity.    SURGICAL HISTORY  patient denies any surgical history    FAMILY HISTORY  No family history on file.    SOCIAL HISTORY  Social History     Socioeconomic History   • Marital status: Single      Social History     Substance and Sexual Activity   Drug Use Not on  "file       CURRENT MEDICATIONS  Home Medications     Reviewed by Abram Ross R.N. (Registered Nurse) on 04/20/22 at 1839  Med List Status: <None>   Medication Last Dose Status   FUROSEMIDE 40 MG TABS  Active   GLYBURIDE 5 MG TABS  Active   hydrocodone-acetaminophen (NORCO) 5-325 MG TABS per tablet  Active   KLOR-CON 10 10 MEQ TBCR  Active   LEVOTHYROXINE 50 MCG TABS  Active   PROVENTIL 90 MCG/ACT AERS  Active   SIMVASTATIN 20 MG TABS  Active                ALLERGIES  No Known Allergies    PHYSICAL EXAM  VITAL SIGNS: BP (!) 166/83   Pulse 79   Temp 36.6 °C (97.9 °F) (Temporal)   Resp 18   Ht 1.575 m (5' 2\")   Wt (!) 143 kg (315 lb)   SpO2 90%   BMI 57.61 kg/m²   Pulse ox interpretation: I interpret this pulse ox as normal.    Constitutional: Alert in no apparent distress.  HENT: No signs of trauma, Bilateral external ears normal, Nose normal.   Eyes: Pupils are equal and reactive, Conjunctiva normal, Non-icteric.   Neck: Normal range of motion, No tenderness, Supple, No stridor.   Cardiovascular: Regular rate and rhythm, no murmurs.   Thorax & Lungs: Normal breath sounds, No respiratory distress, No wheezing, No chest tenderness.  Mildly diminished in the bases  Abdomen: BMI greater than 35, bowel sounds normal, Soft, No tenderness, No masses, No pulsatile masses. No peritoneal signs.  Skin: Warm, Dry, No erythema, No rash.   Back: No bony tenderness, No CVA tenderness.   Extremities: Intact distal pulses, No edema, No tenderness, No cyanosis, Negative Colette's sign.  Musculoskeletal: Good range of motion in all major joints. No tenderness to palpation or major deformities noted.   Neurologic: Alert , Normal motor function, Normal sensory function, No focal deficits noted.   Psychiatric: Affect normal, Judgment normal, Mood normal.     DIAGNOSTIC STUDIES / PROCEDURES    Results for orders placed or performed during the hospital encounter of 04/20/22   CBC with Differential   Result Value Ref Range    WBC " 6.9 4.8 - 10.8 K/uL    RBC 5.86 (H) 4.20 - 5.40 M/uL    Hemoglobin 12.3 12.0 - 16.0 g/dL    Hematocrit 43.1 37.0 - 47.0 %    MCV 73.5 (L) 81.4 - 97.8 fL    MCH 21.0 (L) 27.0 - 33.0 pg    MCHC 28.5 (L) 33.6 - 35.0 g/dL    RDW 58.2 (H) 35.9 - 50.0 fL    Platelet Count 189 164 - 446 K/uL    Neutrophils-Polys 75.00 (H) 44.00 - 72.00 %    Lymphocytes 13.00 (L) 22.00 - 41.00 %    Monocytes 10.90 0.00 - 13.40 %    Eosinophils 0.10 0.00 - 6.90 %    Basophils 0.60 0.00 - 1.80 %    Immature Granulocytes 0.40 0.00 - 0.90 %    Nucleated RBC 0.60 /100 WBC    Neutrophils (Absolute) 5.14 2.00 - 7.15 K/uL    Lymphs (Absolute) 0.89 (L) 1.00 - 4.80 K/uL    Monos (Absolute) 0.75 0.00 - 0.85 K/uL    Eos (Absolute) 0.01 0.00 - 0.51 K/uL    Baso (Absolute) 0.04 0.00 - 0.12 K/uL    Immature Granulocytes (abs) 0.03 0.00 - 0.11 K/uL    NRBC (Absolute) 0.04 K/uL    Hypochromia 1+     Anisocytosis 1+     Macrocytosis 1+    Comp Metabolic Panel   Result Value Ref Range    Sodium 131 (L) 135 - 145 mmol/L    Potassium 4.4 3.6 - 5.5 mmol/L    Chloride 98 96 - 112 mmol/L    Co2 20 20 - 33 mmol/L    Anion Gap 13.0 7.0 - 16.0    Glucose 111 (H) 65 - 99 mg/dL    Bun 28 (H) 8 - 22 mg/dL    Creatinine 0.96 0.50 - 1.40 mg/dL    Calcium 8.8 8.5 - 10.5 mg/dL    AST(SGOT) 27 12 - 45 U/L    ALT(SGPT) 21 2 - 50 U/L    Alkaline Phosphatase 152 (H) 30 - 99 U/L    Total Bilirubin 2.7 (H) 0.1 - 1.5 mg/dL    Albumin 3.5 3.2 - 4.9 g/dL    Total Protein 7.5 6.0 - 8.2 g/dL    Globulin 4.0 (H) 1.9 - 3.5 g/dL    A-G Ratio 0.9 g/dL   D-DIMER   Result Value Ref Range    D-Dimer Screen 12.56 (H) 0.00 - 0.50 ug/mL (FEU)   MAGNESIUM   Result Value Ref Range    Magnesium 1.6 1.5 - 2.5 mg/dL   proBrain Natriuretic Peptide, NT   Result Value Ref Range    NT-proBNP 3470 (H) 0 - 125 pg/mL   TROPONIN   Result Value Ref Range    Troponin T 25 (H) 6 - 19 ng/L   ESTIMATED GFR   Result Value Ref Range    GFR (CKD-EPI) 71 >60 mL/min/1.73 m 2   PERIPHERAL SMEAR REVIEW   Result Value  Ref Range    Peripheral Smear Review see below    PLATELET ESTIMATE   Result Value Ref Range    Plt Estimation Normal    MORPHOLOGY   Result Value Ref Range    RBC Morphology Present     Polychromia 1+     Poikilocytosis 1+     Ovalocytes 1+     Echinocytes 1+    DIFFERENTIAL COMMENT   Result Value Ref Range    Comments-Diff see below    EKG   Result Value Ref Range    Report       [facility] Emergency Dept[facility]    Test Date:  2022  Pt Name:    DULCE BOSTON             Department: ER  MRN:        [id number]                      Room:  Gender:     Female                       Technician: [id number]  :        [id number]                   Requested By:ER TRIAGE PROTOCOL  Order #:    [id number]                    Reading MD:    Measurements  Intervals                                Axis  Rate:       92                           P:          45  AR:         157                          QRS:        127  QRSD:       87                           T:          -28  QT:         366  QTc:        453    Interpretive Statements  Sinus rhythm  Low voltage, precordial leads  Probable right ventricular hypertrophy  Borderline T abnormalities, diffuse leads  No previous ECG available for comparison           RADIOLOGY  CT-CTA CHEST PULMONARY ARTERY W/ RECONS   Final Result         1.  Right lower lobe segmental and subsegmental pulmonary embolus. Right middle lobe and left lower lobe subsegmental pulmonary blood.   2.  Asymmetric enlargement of the right ventricle measuring 4.5 cm versus 2.9 cm for the left ventricle. Could represent component of right ventricular strain.   3.  Enlargement and the main pulmonary artery, consider pulmonary arterial hypertension.   4.  Hazy ground glass pulmonary opacities, appearance suggests edema and/or infiltrate.   5.  Ascites along the left abdominal wall   6.  Hepatomegaly with regular hepatic contour favoring changes of cirrhosis.   7.  Small pericardial effusion   8.   Atherosclerosis and atherosclerotic coronary artery disease.      These findings were discussed with the patient's clinician, Shaan Davila, on 4/20/2022 9:01 PM.      CT-RENAL COLIC EVALUATION(A/P W/O)   Final Result         1.  Hazy groundglass opacities in the bilateral lung bases suggests edema or atypical infiltrates.   2.  Hepatomegaly and irregular hepatic contour favoring changes of cirrhosis.   3.  Scattered mild to moderate abdominal ascites.   4.  3.5 cm fat-containing umbilical hernia.   5.  Diverticulosis   6.  Subcutaneous fat stranding in the anterior abdominal wall, could represent lymphedema and/or cellulitis.      DX-CHEST-PORTABLE (1 VIEW)   Final Result      Cardiomegaly with diffuse interstitial prominence which may be secondary to edema or infiltrate.        The radiologist's interpretation of all radiological studies have been reviewed and images independently viewed by me.    COURSE & MEDICAL DECISION MAKING  Nursing notes, VS, PMSFHx reviewed in chart.    7:21 PM Patient seen and examined at bedside.Ordered for CBC, CMP, magnesium, troponin, BNP, procalcitonin, ECG, x-ray, CT, dimer to evaluate her symptoms.     8:19 PM  Patient is reevaluated, updated on results thus far, plan for CTA    9:08 PM  Received a call from radiology regarding the patient's CT scan.  I discussed these findings with the patient, plan for hospitalization, discussed the case with hospitalist for admission        Decision Making:  This is a 51 y.o. female presenting with increasing shortness of breath over the last few months.  Patient was found to have multiple subsegmental pulmonary emboli.  As she is hypoxic and with this patient will be admitted for anticoagulation and further care.  She does have some enlargement of her right ventricle although findings suggestive more of chronic pulmonary hypertension than this being a result of her PEs given the size.  Patient was also reporting some increased abdominal  distention, so I obtained a CT of her abdomen.  There does not appear to be any acute pathology although she does have findings suggestive of cirrhosis and small amount of ascites.      Patient is admitted in guarded condition    FINAL IMPRESSION  1. Multiple subsegmental pulmonary emboli without acute cor pulmonale (HCC)    2. Hypoxemia         The note accurately reflects work and decisions made by me.  Shaan Davila M.D.  4/20/2022  9:10 PM

## 2022-04-21 NOTE — PROGRESS NOTES
4 Eyes Skin Assessment Completed by ROB Grimm and ROB Ty.    Head WDL  Ears WDL  Nose WDL  Mouth WDL  Neck WDL  Breast/Chest WDL  Shoulder Blades WDL  Spine WDL  (R) Arm/Elbow/Hand WDL  (L) Arm/Elbow/Hand WDL  Abdomen WDL  Groin WDL  Scrotum/Coccyx/Buttocks WDL  (R) Leg   Swelling  (L) Leg Swelling  (R) Heel/Foot/Toe Boggy  (L) Heel/Foot/Toe Boggy          Devices In Places Blood Pressure Cuff and Pulse Ox      Interventions In Place N/A    Possible Skin Injury No    Pictures Uploaded Into Epic N/A  Wound Consult Placed N/A  RN Wound Prevention Protocol Ordered No

## 2022-04-21 NOTE — ASSESSMENT & PLAN NOTE
Patient reports she has a history of hypothyroidism  TSH is within normal limits  -No indication to start medications at this time.  -Follow-up with primary care physician as outpatient

## 2022-04-21 NOTE — DIETARY
NUTRITION SERVICES: BMI - Pt with BMI >40 (=Body mass index is 56.41 kg/m².), Class III obesity. Weight loss counseling not appropriate in acute care setting. RECOMMEND - If appropriate at DC please refer to outpatient nutrition services for weight management.

## 2022-04-21 NOTE — ED NOTES
2nd PIV established, pt tolerated well. Labs collected and sent, updated on POC. T8 notified pt ready. Friend at bedside.

## 2022-04-21 NOTE — ASSESSMENT & PLAN NOTE
Likely due to DVTs, causing pulmonary hypertension and asymmetric enlarged right ventricle.  CTA showed multiple segmental subsegmental pulmonary embolism.  Abhishek score 2.  Patient has had decreased mobility due to not being able to afford oxygen without insurance and needing oxygen 3 to 5 L when she ambulates.  Ultrasound of the lower extremities showed that the left lower extremity there is acute, occlusive deep venous thrombosis in the popliteal and posterior tibial veins.  TTE on 4/21/2022 showed ejection fraction 55% with severely dilated right ventricle and pulmonary arterial systolic blood pressure of 105 mmHg.  -Patient will need to continue Xarelto for at least 3 months.  -After 3 months patient will need to follow-up with D-dimer.  If D-dimer decreases, patient's Xarelto can be decreased to half dose for 3 to 6 months.  If patient is still symptomatic after 6 months then a repeat echo will be needed.  Patient may need to stay on Xarelto for for life.  -Keep keep saturation between 88 and 92%.  -Continue gentle diuresis bearing in mind the patient has right ventricle systolic pressure of 105 mmHg. (Hold Lasix for SBP < 120). Will diurese patient for an additional day IV before transitioning to oral Lasix.   -I's and O's and Daily weights.  -Home O2 oxygen ordered.

## 2022-04-21 NOTE — ED NOTES
Bedside report given to T8 RN. Pt transferring to T801/00 via gurney on cardiac monitor, pt A&Ox4, on 5L O2 NC. Belongings w/i possession.

## 2022-04-21 NOTE — ASSESSMENT & PLAN NOTE
Likely due to pulmonary embolism and decompensated right heart failure.  -Keep oxygen requirement with saturation between 88 and 92%.  -See plan above.  -Continue IV diuresis for an additional day, plan to switch to oral Lasix tomorrow.

## 2022-04-21 NOTE — H&P
Hospital Medicine History & Physical Note    Date of Service  4/20/2022    Primary Care Physician  Pcp Pt States None    Consultants  None    Code Status  Full Code    Chief Complaint  Chief Complaint   Patient presents with   • Shortness of Breath     X 'few months', hx COPD but SOB worse now, pt has rx for home O2 but does not have any r/t insurance, denies cardiac hx or chest pain, states chronic cough with minimal clear phlegm, denies fevers   • Abdominal Swelling     Pt states abd distension X 3-4 months, denies hx of such, denies liver hx, states some mild constipation, post-menopausal with tubal ligation, denies urinary or stool changes, intermittent nausea but no vomiting        History of Presenting Illness  Renetta Montes is a 51 y.o. obese female with h/o asthma, hypothyroidism who presented 4/20/2022 with abdominal bloating and SOB. Patient reported being off all her meds since 2019 after losing medical insurance. 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 She reported having abd distension and bloating. Patient had bariatric surgery before, became concern due to abd bloating, becoming harder to breathe therefore decided to come to ER for evaluation. She was saturating in 70% on RA at ER arrival, placed on NC support with improvement. CT AP noted cirrhosis, umbilical hernia, subcutaneous fat stranding in anterior abdominal wall. Pt was found to have mildly elevated trop T of 25, D-dimer elevated at 12.56. CTA PE was pursued and found to have b/l PE. Admission requested for further evaluation and treatment.    I discussed the plan of care with patient and bedside RN.    Review of Systems  Review of Systems   Constitutional: Positive for malaise/fatigue. Negative for chills and fever.   HENT: Negative for hearing loss and tinnitus.    Eyes: Negative for blurred vision and double vision.   Respiratory: Positive for shortness of breath. Negative for cough and sputum production.    Cardiovascular:  Positive for leg swelling. Negative for chest pain and palpitations.   Gastrointestinal: Negative for abdominal pain, heartburn, nausea and vomiting.        Abdominal bloating   Genitourinary: Negative for dysuria, flank pain and hematuria.   Musculoskeletal: Negative for falls, myalgias and neck pain.   Skin: Negative for itching and rash.   Neurological: Negative for dizziness, speech change, focal weakness and headaches.   Endo/Heme/Allergies: Negative for environmental allergies. Does not bruise/bleed easily.   Psychiatric/Behavioral: Negative for depression and substance abuse.   All other systems reviewed and are negative.      Past Medical History   has a past medical history of Diabetes, Dyslipidemia, Leg swelling (chronic), and Obesity.    Surgical History   has no past surgical history on file.     Family History  family history is not on file.   Family history reviewed with patient. There is family history that is pertinent to the chief complaint.   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 Taking?   FUROSEMIDE 40 MG TABS   Yes No   Sig: Take 20 mg by mouth every day.   GLYBURIDE 5 MG TABS   Yes No   Sig: Take 5 mg by mouth every day.   KLOR-CON 10 10 MEQ TBCR   Yes No   Sig: Take 10 mEq by mouth every day.   LEVOTHYROXINE 50 MCG TABS   Yes No   Sig: Take 50 mcg by mouth every day.   PROVENTIL 90 MCG/ACT AERS   Yes No   Sig: Inhale 2 Puffs by mouth every four hours as needed for Shortness of Breath.   SIMVASTATIN 20 MG TABS   Yes No   Sig: Take 20 mg by mouth every evening.   hydrocodone-acetaminophen (NORCO) 5-325 MG TABS per tablet   No No   Sig: Take 1-2 Tabs by mouth every four hours as needed.       Facility-Administered Medications: None       Physical Exam  Temp:  [36.6 °C (97.9 °F)] 36.6 °C (97.9 °F)  Pulse:  [79-98] 79  Resp:  [18-24] 18  BP: (154-166)/() 166/83  SpO2:  [70 %-100 %] 90 %  Blood Pressure: (!) 166/83   Temperature: 36.6 °C (97.9 °F)   Pulse: 79   Respiration: 18   Pulse Oximetry: 90 %       Physical Exam  Vitals and nursing note reviewed.   Constitutional:       General: She is not in acute distress.     Appearance: She is obese.   HENT:      Head: Normocephalic and atraumatic.      Nose: Nose normal.      Mouth/Throat:      Mouth: Mucous membranes are moist.      Pharynx: Oropharynx is clear.   Eyes:      General: No scleral icterus.     Extraocular Movements: Extraocular movements intact.   Cardiovascular:      Rate and Rhythm: Normal rate and regular rhythm.      Pulses: Normal pulses.      Heart sounds:     No friction rub.   Pulmonary:      Effort: No respiratory distress.      Breath sounds: No stridor. No wheezing.      Comments: On 4L NC  Abdominal:      General: There is no distension.      Palpations: Abdomen is soft.      Tenderness: There is no abdominal tenderness. There is no guarding.   Musculoskeletal:         General: No tenderness or signs of injury. Normal range of motion.      Cervical back: Neck supple. No tenderness.      Comments: +1 pitting edema LE   Skin:     General: Skin is warm and dry.      Capillary Refill: Capillary refill takes less than 2 seconds.   Neurological:      General: No focal deficit present.      Mental Status: She is alert and oriented to person, place, and time.      Motor: No weakness.   Psychiatric:         Mood and Affect: Mood normal.         Laboratory:  Recent Labs     04/20/22 1910   WBC 6.9   RBC 5.86*   HEMOGLOBIN 12.3   HEMATOCRIT 43.1   MCV 73.5*   MCH 21.0*   MCHC 28.5*   RDW 58.2*   PLATELETCT 189     Recent Labs     04/20/22 1910   SODIUM 131*   POTASSIUM 4.4   CHLORIDE 98   CO2 20   GLUCOSE 111*   BUN 28*   CREATININE  0.96   CALCIUM 8.8     Recent Labs     04/20/22 1910   ALTSGPT 21   ASTSGOT 27   ALKPHOSPHAT 152*   TBILIRUBIN 2.7*   GLUCOSE 111*         Recent Labs     04/20/22 1910   NTPROBNP 3470*         Recent Labs     04/20/22 1910   TROPONINT 25*       Imaging:  CT-CTA CHEST PULMONARY ARTERY W/ RECONS   Final Result         1.  Right lower lobe segmental and subsegmental pulmonary embolus. Right middle lobe and left lower lobe subsegmental pulmonary blood.   2.  Asymmetric enlargement of the right ventricle measuring 4.5 cm versus 2.9 cm for the left ventricle. Could represent component of right ventricular strain.   3.  Enlargement and the main pulmonary artery, consider pulmonary arterial hypertension.   4.  Hazy ground glass pulmonary opacities, appearance suggests edema and/or infiltrate.   5.  Ascites along the left abdominal wall   6.  Hepatomegaly with regular hepatic contour favoring changes of cirrhosis.   7.  Small pericardial effusion   8.  Atherosclerosis and atherosclerotic coronary artery disease.      These findings were discussed with the patient's clinician, Shaan Davila, on 4/20/2022 9:01 PM.      CT-RENAL COLIC EVALUATION(A/P W/O)   Final Result         1.  Hazy groundglass opacities in the bilateral lung bases suggests edema or atypical infiltrates.   2.  Hepatomegaly and irregular hepatic contour favoring changes of cirrhosis.   3.  Scattered mild to moderate abdominal ascites.   4.  3.5 cm fat-containing umbilical hernia.   5.  Diverticulosis   6.  Subcutaneous fat stranding in the anterior abdominal wall, could represent lymphedema and/or cellulitis.      DX-CHEST-PORTABLE (1 VIEW)   Final Result      Cardiomegaly with diffuse interstitial prominence which may be secondary to edema or infiltrate.      EC-ECHOCARDIOGRAM COMPLETE W/O CONT    (Results Pending)       X-Ray:  I have personally reviewed the images and compared with prior images.  EKG:  I have personally reviewed the images and  compared with prior images.    Assessment/Plan:  I anticipate this patient will require at least two midnights for appropriate medical management, necessitating inpatient admission.    * Pulmonary embolism and infarction (HCC)- (present on admission)  Assessment & Plan  CTA PE: b/l PE  Denies OCP use, smoking, prior VTE nor FH of VTE  Likely provoked - immobilization/[employer]  Wean off oxygen as tolerated  F/u echo to evaluate for RV strain  Heparin gtt, transition to DOAC when appropriate    Acute respiratory failure with hypoxia (HCC)  Assessment & Plan  Likely 2/2 to PE  On 6L --> 4L -- wean off as tolerated  Anticoagulation  Diuresis  F/u echo    Leg swelling- (present on admission)  Assessment & Plan  Diuresis  Would not pursue US as pt is already on anticoagulation    Cirrhosis (HCC)  Assessment & Plan  ?secondary to CUMMINS  Lasix/spironolactone as tolerated  outpt f/u    Hyperbilirubinemia  Assessment & Plan  T.bili - 2.7  No acute etiology seen on CT AP    Hypothyroidism  Assessment & Plan  Synthroid  Off meds for >3yrs - f/u TSH    Asthma  Assessment & Plan  Not in exacerbation  PRN nebs    Obesity- (present on admission)  Assessment & Plan  S/p gastric bypass  Diet and lifestyle modification    Dyslipidemia- (present on admission)  Assessment & Plan  statin    Diabetes  Assessment & Plan  Per history  ISS, f/u HbA1c      VTE prophylaxis: heparin gtt

## 2022-04-21 NOTE — CARE PLAN
The patient is Watcher - Medium risk of patient condition declining or worsening         Progress made toward(s) clinical / shift goals:        Problem: Knowledge Deficit - Standard  Goal: Patient and family/care givers will demonstrate understanding of plan of care, disease process/condition, diagnostic tests and medications  Outcome: Progressing

## 2022-04-21 NOTE — PROGRESS NOTES
Assuming patient care of T-801. Report received bedside in R10 by Mary RIVAS. Patient visually assessed and updated on POC.     Pt transferred to T801 by ACLS RN.    Bed is locked and in lowest position. Bed alarm checked for proper functioning and is currently on.  All obstacles clear from floor and personal belongings at bedside.    RN call button is w/in reach and education provided on proper use.     Medications, labs and orders reviewed.

## 2022-04-22 ENCOUNTER — APPOINTMENT (OUTPATIENT)
Dept: RADIOLOGY | Facility: MEDICAL CENTER | Age: 52
DRG: 175 | End: 2022-04-22
Attending: STUDENT IN AN ORGANIZED HEALTH CARE EDUCATION/TRAINING PROGRAM
Payer: MEDICAID

## 2022-04-22 ENCOUNTER — PATIENT OUTREACH (OUTPATIENT)
Dept: HEALTH INFORMATION MANAGEMENT | Facility: OTHER | Age: 52
End: 2022-04-22
Payer: MEDICAID

## 2022-04-22 LAB
ALBUMIN SERPL BCP-MCNC: 3.4 G/DL (ref 3.2–4.9)
ALBUMIN/GLOB SERPL: 0.9 G/DL
ALP SERPL-CCNC: 128 U/L (ref 30–99)
ALT SERPL-CCNC: 18 U/L (ref 2–50)
ANION GAP SERPL CALC-SCNC: 10 MMOL/L (ref 7–16)
AST SERPL-CCNC: 25 U/L (ref 12–45)
BILIRUB SERPL-MCNC: 1.5 MG/DL (ref 0.1–1.5)
BUN SERPL-MCNC: 28 MG/DL (ref 8–22)
CALCIUM SERPL-MCNC: 8.7 MG/DL (ref 8.5–10.5)
CHLORIDE SERPL-SCNC: 99 MMOL/L (ref 96–112)
CO2 SERPL-SCNC: 25 MMOL/L (ref 20–33)
CREAT SERPL-MCNC: 0.94 MG/DL (ref 0.5–1.4)
GFR SERPLBLD CREATININE-BSD FMLA CKD-EPI: 73 ML/MIN/1.73 M 2
GLOBULIN SER CALC-MCNC: 3.8 G/DL (ref 1.9–3.5)
GLUCOSE SERPL-MCNC: 91 MG/DL (ref 65–99)
POTASSIUM SERPL-SCNC: 4 MMOL/L (ref 3.6–5.5)
PROT SERPL-MCNC: 7.2 G/DL (ref 6–8.2)
SODIUM SERPL-SCNC: 134 MMOL/L (ref 135–145)

## 2022-04-22 PROCEDURE — 93970 EXTREMITY STUDY: CPT

## 2022-04-22 PROCEDURE — 36415 COLL VENOUS BLD VENIPUNCTURE: CPT

## 2022-04-22 PROCEDURE — 700102 HCHG RX REV CODE 250 W/ 637 OVERRIDE(OP): Performed by: STUDENT IN AN ORGANIZED HEALTH CARE EDUCATION/TRAINING PROGRAM

## 2022-04-22 PROCEDURE — A9270 NON-COVERED ITEM OR SERVICE: HCPCS | Performed by: STUDENT IN AN ORGANIZED HEALTH CARE EDUCATION/TRAINING PROGRAM

## 2022-04-22 PROCEDURE — 80053 COMPREHEN METABOLIC PANEL: CPT

## 2022-04-22 PROCEDURE — 700111 HCHG RX REV CODE 636 W/ 250 OVERRIDE (IP): Performed by: STUDENT IN AN ORGANIZED HEALTH CARE EDUCATION/TRAINING PROGRAM

## 2022-04-22 PROCEDURE — 99233 SBSQ HOSP IP/OBS HIGH 50: CPT | Mod: GC | Performed by: INTERNAL MEDICINE

## 2022-04-22 PROCEDURE — 770020 HCHG ROOM/CARE - TELE (206)

## 2022-04-22 RX ADMIN — SALINE NASAL SPRAY 2 SPRAY: 1.5 SOLUTION NASAL at 05:58

## 2022-04-22 RX ADMIN — RIVAROXABAN 15 MG: 15 TABLET, FILM COATED ORAL at 07:54

## 2022-04-22 RX ADMIN — RIVAROXABAN 15 MG: 15 TABLET, FILM COATED ORAL at 18:25

## 2022-04-22 RX ADMIN — ACETAMINOPHEN 650 MG: 325 TABLET, FILM COATED ORAL at 15:37

## 2022-04-22 RX ADMIN — ATORVASTATIN CALCIUM 20 MG: 20 TABLET, FILM COATED ORAL at 18:25

## 2022-04-22 RX ADMIN — ACETAMINOPHEN 650 MG: 325 TABLET, FILM COATED ORAL at 07:56

## 2022-04-22 RX ADMIN — FUROSEMIDE 40 MG: 10 INJECTION, SOLUTION INTRAMUSCULAR; INTRAVENOUS at 05:35

## 2022-04-22 ASSESSMENT — ENCOUNTER SYMPTOMS
SHORTNESS OF BREATH: 0
EYES NEGATIVE: 1
PALPITATIONS: 0
HEMOPTYSIS: 0
SPUTUM PRODUCTION: 0
BLURRED VISION: 0
CHILLS: 0
ORTHOPNEA: 0
WEIGHT LOSS: 0
TINGLING: 0
SINUS PAIN: 1
ABDOMINAL PAIN: 0
ORTHOPNEA: 1
COUGH: 0
SHORTNESS OF BREATH: 1
BLOOD IN STOOL: 0
HEARTBURN: 0
NECK PAIN: 0
DOUBLE VISION: 0
TREMORS: 0
BRUISES/BLEEDS EASILY: 0
NAUSEA: 0
NEUROLOGICAL NEGATIVE: 1
SENSORY CHANGE: 0
MUSCULOSKELETAL NEGATIVE: 1
VOMITING: 0
FEVER: 0
MYALGIAS: 0
WHEEZING: 0
DIZZINESS: 0
PSYCHIATRIC NEGATIVE: 1
HEADACHES: 0

## 2022-04-22 ASSESSMENT — FIBROSIS 4 INDEX
FIB4 SCORE: 2.16
FIB4 SCORE: 2.16

## 2022-04-22 NOTE — FACE TO FACE
"Face to Face Note  -  Durable Medical Equipment    Norah Salazar D.O. - NPI: [id number]  I certify that this patient is under my care and that they had a durable medical equipment(DME)face to face encounter by myself that meets the physician DME face-to-face encounter requirements with this patient on:    Date of encounter:   Patient:                    MRN:                       YOB: 2023  Renetta Montes  [id number]  1970     The encounter with the patient was in whole, or in part, for the following medical condition, which is the primary reason for durable medical equipment:  Other - Pulmonary hypertension and acute hypoxic respiratory failure    I certify that, based on my findings, the following durable medical equipment is medically necessary:  Oxygen.    HOME O2 Saturation Measurements:(Values must be present for Home Oxygen orders)  Room air sat at rest: 78  Room air sat with amb: 76  With liters of O2: 4, O2 sat at rest with O2: 88  With Liters of O2: 8, O2 sat with amb with O2 : 89  Is the patient mobile?: Yes    My Clinical findings support the need for the above equipment due to:  Hypoxia    Supporting Symptoms: The patient requires supplemental oxygen, as the following interventions have been tried with limited or no improvement: \"Ambulation with oximetry    If patient feels more short of breath, they can go up to 6 liters per minute and contact healthcare provider.  "

## 2022-04-22 NOTE — CARE PLAN
Problem: Knowledge Deficit - Standard  Goal: Patient and family/care givers will demonstrate understanding of plan of care, disease process/condition, diagnostic tests and medications  Outcome: Progressing   The patient is Stable - Low risk of patient condition declining or worsening    Shift Goals  Clinical Goals: wean O2, discharge  Patient Goals: rest  Family Goals: CAT    Progress made toward(s) clinical / shift goals:  home O2 approved, delivery for today     Patient is not progressing towards the following goals:

## 2022-04-22 NOTE — NON-PROVIDER
Daily Progress Note:     Date of Service: 4/22/2022  Primary Team: [facility] Team   Attending: Shannon Zhu MD   Senior Resident: Dr. Wheeler  Intern: Dr. Salazar  Contact:  [phone]    ID:   Renetta is a 51 y.o. female who presented 4/20/2022 with worsening shortness of breath secondary to a pulmonary embolism.    Chief Complaint:   Progressively worsening shortness of breath x3 months    Interval Events:  Patient had an echocardiogram yesterday evening. Result showed:.   -Normal left ventricular systolic function.  -Severely dilated right ventricle.  -Reduced right ventricular systolic function.  -Dilated inferior vena cava without inspiratory collapse.  -Right ventricular systolic pressure is estimated to be  105 mmHg.  -Moderate to severe tricuspid regurgitation.  -Ejection Fraction of 55%    Patient also had an US of the lower extremities. Results showed:  Acute, occlusive deep venous thrombosis in the popliteal and posterior tibial veins.    Subjective:  Renetta notes that she is still experiencing swelling in her lower legs. The patient states that she is not feeling short of breath. Nor has she experienced any fevers, chills, chest pain, palpitations, abdominal pain, or orthopnea.     Consultants/Specialty:  Pulmonology    Review of Systems:    Review of Systems   Constitutional: Negative for chills and fever.   HENT: Positive for sinus pain.    Eyes: Negative.    Respiratory: Negative for cough, shortness of breath and wheezing.    Cardiovascular: Positive for leg swelling. Negative for chest pain, palpitations and orthopnea.   Gastrointestinal: Negative for abdominal pain, nausea and vomiting.   Genitourinary: Negative.    Musculoskeletal: Negative.    Skin: Negative.    Neurological: Negative.    Endo/Heme/Allergies: Negative.    Psychiatric/Behavioral: Negative.        Objective Data:   Physical Exam:   Vitals:   Temp:  [36.1 °C (97 °F)-36.8 °C (98.2 °F)] 36.8 °C (98.2 °F)  Pulse:   [71-95] 74  Resp:  [16-20] 17  BP: (100-127)/(59-86) 100/59  SpO2:  [90 %-98 %] 96 % on 4L of supplemental oxygen.  Constitutional:       General: She is not in acute distress.     Appearance: Normal appearance. She is not ill-appearing, toxic-appearing or diaphoretic.   HENT:      Head: Normocephalic and atraumatic.      Nose: No congestion or rhinorrhea.      Mouth/Throat:      Pharynx: No oropharyngeal exudate or posterior oropharyngeal erythema.   Eyes:      Extraocular Movements: Extraocular movements intact.      Conjunctiva/sclera: Conjunctivae normal.      Pupils: Pupils are equal, round, and reactive to light.   Cardiovascular:      Rate and Rhythm: Normal rate and regular rhythm.      Pulses: Normal pulses.      Heart sounds: Normal heart sounds.   Pulmonary:      Effort: Pulmonary effort is normal. No respiratory distress.   Chest:      Chest wall: No tenderness.   Abdominal:      General: Bowel sounds are normal      There is no abdominal pain or distension.  Musculoskeletal:         General: Normal range of motion.      Cervical back: Normal range of motion.   Skin:     General: Skin is warm and dry.      Capillary Refill: Capillary refill takes less than 2 seconds.      Comments: 3+ pitting edema of the bilateral lower extremities.  Neurological:      General: No focal deficit present.      Mental Status: She is alert and oriented to person, place, and time. Mental status is at baseline.   Psychiatric:         Mood and Affect: Mood normal.         Behavior: Behavior normal.         Thought Content: Thought content normal.         Judgment: Judgment normal.     Labs:   Recent Labs     04/20/22  1910 04/21/22  0017 04/21/22  1225   WBC 6.9 8.3  --    RBC 5.86* 5.79*  --    HEMOGLOBIN 12.3 12.3  --    HEMATOCRIT 43.1 42.1  --    MCV 73.5* 72.7*  --    MCH 21.0* 21.2*  --    RDW 58.2* 56.7*  --    PLATELETCT 189 151* 142*   NEUTSPOLYS 75.00* 73.80*  --    LYMPHOCYTES 13.00* 14.10*  --    MONOCYTES 10.90  11.60  --    EOSINOPHILS 0.10 0.10  --    BASOPHILS 0.60 0.20  --    RBCMORPHOLO Present  --   --      Recent Labs     04/20/22  1910 04/21/22  0017 04/22/22  0655   SODIUM 131* 135 134*   POTASSIUM 4.4 4.2 4.0   CHLORIDE 98 101 99   CO2 20 20 25   GLUCOSE 111* 121* 91   BUN 28* 30* 28*         Imaging:   US-EXTREMITY VENOUS LOWER BILAT   Final Result      EC-ECHOCARDIOGRAM COMPLETE W/O CONT   Final Result      CT-CTA CHEST PULMONARY ARTERY W/ RECONS   Final Result         1.  Right lower lobe segmental and subsegmental pulmonary embolus. Right middle lobe and left lower lobe subsegmental pulmonary blood.   2.  Asymmetric enlargement of the right ventricle measuring 4.5 cm versus 2.9 cm for the left ventricle. Could represent component of right ventricular strain.   3.  Enlargement and the main pulmonary artery, consider pulmonary arterial hypertension.   4.  Hazy ground glass pulmonary opacities, appearance suggests edema and/or infiltrate.   5.  Ascites along the left abdominal wall   6.  Hepatomegaly with regular hepatic contour favoring changes of cirrhosis.   7.  Small pericardial effusion   8.  Atherosclerosis and atherosclerotic coronary artery disease.      These findings were discussed with the patient's clinician, Shaan Davila, on 4/20/2022 9:01 PM.      CT-RENAL COLIC EVALUATION(A/P W/O)   Final Result         1.  Hazy groundglass opacities in the bilateral lung bases suggests edema or atypical infiltrates.   2.  Hepatomegaly and irregular hepatic contour favoring changes of cirrhosis.   3.  Scattered mild to moderate abdominal ascites.   4.  3.5 cm fat-containing umbilical hernia.   5.  Diverticulosis   6.  Subcutaneous fat stranding in the anterior abdominal wall, could represent lymphedema and/or cellulitis.      DX-CHEST-PORTABLE (1 VIEW)   Final Result      Cardiomegaly with diffuse interstitial prominence which may be secondary to edema or infiltrate.          Problem Representation: Renetta  Simon is a 51 YOF with a PMHx of COPD and asthma and was admitted on 4/20/22 for shortness of breath secondary to a pulmonary embolism.     Assessment/Plan:   1. Shortness of Breath  This problem is acute-subacute in duration. Patient's condition is currently stable. Patient's ECHO revealed severely dilated right ventricle and RVSP of 105 mmHg. Ultrasound of the extremities revealed DVT in the popliteal and posterior tibial veins. Patient's chest CT revealed a right lower lobe segmental and subsegmental pulmonary embolus. Patient's clinical condition has improved since admission. Vital signs are stable, patient's SpO2 at 98% on 4L NC. The patient has been transitioned to Riveroxaban.  Plan for this issue will be:  1. Continue Xarelto.   2. Patient will need to continue DOAC therapy for at least 3 months. Afterwards, patient will need to repeat D-dimer. If it is still elevated, then continue full dose Xarelto for an additional 3 months. If D-dimer is decreased, then we can transition to a half-dose of Xarelto for 3-6 months. In addition, if patient is still symptomatic in 6 months, then a repeat ECHO will be warranted. If asymptomatic, then no ECHO is needed.   2. Wean patient off of supplemental oxygen until she can tolerate SpO2 of 88%-92% on room air.    3. Continue with SCD therapy until patient is ambulatory.       2. Leg Swelling  This problem is acute-subacute in duration. CT of the chest revealed that the patient has asymmetric enlargement of the right ventricle. Patient also had a BNP of 3470 while in the ED.  Plan for this issue includes:  1. Lasix 40 mg. Continue to follow BUN/Cr trends.  3. SCD therapy.     3. Abdominal Swelling  This problem is chronic in duration as patient notes she has been having tenderness for 3-4 months. CT of the abdomen revealed hepatomegaly along with mild ascites. Patient denies any long term alcohol use.   Plan for this issue includes getting a hepatitis panel.      4.  Asthma  This problem is chronic. Patient states that she used to have albuterol, but has since run out.   Plan includes nebulizer therapy as needed and eventual follow up with primary care.     5. Hyperlipidemia  This problem is chronic. Patient states that she used to take atorvastatin, but has since run out.   Plan includes:  1. Atorvastatin 20 mg QD  2. Follow up with primary care once patient is suitable for discharge.      6. Diabetes Mellitus  This problem is chronic. Patient states that she used to take glyburide, but has since run out. Most recent A1C on 4/21/22 was 5.7  Plan includes:  Patient will need refill of medications with eventual follow up with PCP.     7. Headache  This problem is acute in duration and has resolved. Patient's headache is likely due to the fact that she has been unable to sleep since admission.   This issue is resolving.  Plan includes:  1. Tylenol PRN for pain.

## 2022-04-22 NOTE — DISCHARGE PLANNING
Received Choice form at 1045  Agency/Facility Name: [facility]  Referral sent per Choice form @ [phone]-  Agency[facility]Facility Name: [facility]   Spoke To: Margaret  Outcome: Pt approved [facility]ia will be bedside with order in 1 - 2 hours.

## 2022-04-22 NOTE — DISCHARGE PLANNING
Anticipated Discharge Disposition: home with home O2.     Action: failed home O2 test. Discussed home O2 with patient. She is in collections with [facility] from the last time she got oxygen. Agreed to any choice that will give her oxygen. Choice form signed for [facility]; to be as an approved service.     Alfredo Love RN approved $180 for [facility] for O2. Approved services form faxed to [facility], [phone].     Barriers to Discharge: home O2.     Plan: continue to work with [facility]ona to obtain home O2.

## 2022-04-22 NOTE — HEART FAILURE PROGRAM
Patient is admitted primarily for multiple PE's. She is also diagnosed with heart failure.    She has several factors that would fit our cardiology agreed upon criteria for deferring a heart failure diagnosis. Please remove HF from the problem list as well as the progress notes going forward.    For questions, please contact Sofy Beal RN, [facility] or Jessee German MD through [employer]. Thanks for your cooperation.      • COPD: O2 Dependent or on Chronic Steroids    • Restrictive lung disease  • BMI > 40  • Acute PE  • PAH  • Active PNA  • GFR < 30  • Uncontrolled HTN  • Anemia with Hg <10  • Hemodynamically significant valve disease: Moderate or greater- MODERATE TO SEVERE TR  • Uncontrolled AF  • Cirrhosis  • Right sided heart failure    Dr. German and Dr. Alcantar were notified of this note's filing and circumstances at [phone].

## 2022-04-22 NOTE — PROGRESS NOTES
Assuming patient care of T- 801. Report received bedside by Eloisa RIVAS. Patient visually assessed and updated on POC.     Bed is locked and in lowest position. Bed alarm checked for proper functioning and is currently on.  All obstacles clear from floor and personal belongings at bedside.    RN call button is w/in reach and education provided on proper use.     Medications, labs and orders reviewed.

## 2022-04-22 NOTE — CARE PLAN
The patient is Watcher - Medium risk of patient condition declining or worsening    Shift Goals  Clinical Goals: Monitor O2 & heparin gtt  Patient Goals: Sleep  Family Goals: CAT    Progress made toward(s) clinical / shift goals:        Problem: Knowledge Deficit - Standard  Goal: Patient and family/care givers will demonstrate understanding of plan of care, disease process/condition, diagnostic tests and medications  Outcome: Progressing     Problem: Pain - Standard  Goal: Alleviation of pain or a reduction in pain to the patient’s comfort goal  Outcome: Progressing

## 2022-04-23 LAB
ALBUMIN SERPL BCP-MCNC: 3.3 G/DL (ref 3.2–4.9)
ALBUMIN/GLOB SERPL: 0.9 G/DL
ALP SERPL-CCNC: 122 U/L (ref 30–99)
ALT SERPL-CCNC: 17 U/L (ref 2–50)
ANION GAP SERPL CALC-SCNC: 8 MMOL/L (ref 7–16)
AST SERPL-CCNC: 22 U/L (ref 12–45)
BASOPHILS # BLD AUTO: 0.4 % (ref 0–1.8)
BASOPHILS # BLD: 0.02 K/UL (ref 0–0.12)
BILIRUB SERPL-MCNC: 1.2 MG/DL (ref 0.1–1.5)
BUN SERPL-MCNC: 31 MG/DL (ref 8–22)
CALCIUM SERPL-MCNC: 8.4 MG/DL (ref 8.5–10.5)
CHLORIDE SERPL-SCNC: 101 MMOL/L (ref 96–112)
CO2 SERPL-SCNC: 29 MMOL/L (ref 20–33)
CREAT SERPL-MCNC: 1.12 MG/DL (ref 0.5–1.4)
EOSINOPHIL # BLD AUTO: 0.12 K/UL (ref 0–0.51)
EOSINOPHIL NFR BLD: 2.3 % (ref 0–6.9)
ERYTHROCYTE [DISTWIDTH] IN BLOOD BY AUTOMATED COUNT: 59.1 FL (ref 35.9–50)
GFR SERPLBLD CREATININE-BSD FMLA CKD-EPI: 59 ML/MIN/1.73 M 2
GLOBULIN SER CALC-MCNC: 3.7 G/DL (ref 1.9–3.5)
GLUCOSE SERPL-MCNC: 78 MG/DL (ref 65–99)
HCT VFR BLD AUTO: 38.5 % (ref 37–47)
HGB BLD-MCNC: 10.7 G/DL (ref 12–16)
IMM GRANULOCYTES # BLD AUTO: 0.02 K/UL (ref 0–0.11)
IMM GRANULOCYTES NFR BLD AUTO: 0.4 % (ref 0–0.9)
LYMPHOCYTES # BLD AUTO: 0.87 K/UL (ref 1–4.8)
LYMPHOCYTES NFR BLD: 16.4 % (ref 22–41)
MAGNESIUM SERPL-MCNC: 1.6 MG/DL (ref 1.5–2.5)
MCH RBC QN AUTO: 20.7 PG (ref 27–33)
MCHC RBC AUTO-ENTMCNC: 27.8 G/DL (ref 33.6–35)
MCV RBC AUTO: 74.6 FL (ref 81.4–97.8)
MONOCYTES # BLD AUTO: 0.67 K/UL (ref 0–0.85)
MONOCYTES NFR BLD AUTO: 12.7 % (ref 0–13.4)
NEUTROPHILS # BLD AUTO: 3.59 K/UL (ref 2–7.15)
NEUTROPHILS NFR BLD: 67.8 % (ref 44–72)
NRBC # BLD AUTO: 0 K/UL
NRBC BLD-RTO: 0 /100 WBC
PLATELET # BLD AUTO: 130 K/UL (ref 164–446)
POTASSIUM SERPL-SCNC: 4.4 MMOL/L (ref 3.6–5.5)
PROT SERPL-MCNC: 7 G/DL (ref 6–8.2)
RBC # BLD AUTO: 5.16 M/UL (ref 4.2–5.4)
SODIUM SERPL-SCNC: 138 MMOL/L (ref 135–145)
WBC # BLD AUTO: 5.3 K/UL (ref 4.8–10.8)

## 2022-04-23 PROCEDURE — 770020 HCHG ROOM/CARE - TELE (206)

## 2022-04-23 PROCEDURE — 80053 COMPREHEN METABOLIC PANEL: CPT

## 2022-04-23 PROCEDURE — 36415 COLL VENOUS BLD VENIPUNCTURE: CPT

## 2022-04-23 PROCEDURE — 85025 COMPLETE CBC W/AUTO DIFF WBC: CPT

## 2022-04-23 PROCEDURE — 700102 HCHG RX REV CODE 250 W/ 637 OVERRIDE(OP): Performed by: STUDENT IN AN ORGANIZED HEALTH CARE EDUCATION/TRAINING PROGRAM

## 2022-04-23 PROCEDURE — A9270 NON-COVERED ITEM OR SERVICE: HCPCS | Performed by: STUDENT IN AN ORGANIZED HEALTH CARE EDUCATION/TRAINING PROGRAM

## 2022-04-23 PROCEDURE — 700111 HCHG RX REV CODE 636 W/ 250 OVERRIDE (IP): Performed by: STUDENT IN AN ORGANIZED HEALTH CARE EDUCATION/TRAINING PROGRAM

## 2022-04-23 PROCEDURE — 99233 SBSQ HOSP IP/OBS HIGH 50: CPT | Mod: GC | Performed by: INTERNAL MEDICINE

## 2022-04-23 PROCEDURE — 83735 ASSAY OF MAGNESIUM: CPT

## 2022-04-23 RX ORDER — MAGNESIUM SULFATE HEPTAHYDRATE 40 MG/ML
4 INJECTION, SOLUTION INTRAVENOUS ONCE
Status: COMPLETED | OUTPATIENT
Start: 2022-04-23 | End: 2022-04-23

## 2022-04-23 RX ORDER — FUROSEMIDE 10 MG/ML
40 INJECTION INTRAMUSCULAR; INTRAVENOUS ONCE
Status: COMPLETED | OUTPATIENT
Start: 2022-04-23 | End: 2022-04-23

## 2022-04-23 RX ADMIN — RIVAROXABAN 15 MG: 15 TABLET, FILM COATED ORAL at 17:25

## 2022-04-23 RX ADMIN — FUROSEMIDE 40 MG: 10 INJECTION, SOLUTION INTRAMUSCULAR; INTRAVENOUS at 05:48

## 2022-04-23 RX ADMIN — FUROSEMIDE 40 MG: 10 INJECTION, SOLUTION INTRAMUSCULAR; INTRAVENOUS at 13:49

## 2022-04-23 RX ADMIN — ATORVASTATIN CALCIUM 20 MG: 20 TABLET, FILM COATED ORAL at 17:25

## 2022-04-23 RX ADMIN — SENNOSIDES AND DOCUSATE SODIUM 2 TABLET: 50; 8.6 TABLET ORAL at 18:00

## 2022-04-23 RX ADMIN — MAGNESIUM SULFATE HEPTAHYDRATE 4 G: 40 INJECTION, SOLUTION INTRAVENOUS at 06:44

## 2022-04-23 RX ADMIN — RIVAROXABAN 15 MG: 15 TABLET, FILM COATED ORAL at 05:56

## 2022-04-23 RX ADMIN — ACETAMINOPHEN 650 MG: 325 TABLET, FILM COATED ORAL at 22:37

## 2022-04-23 ASSESSMENT — ENCOUNTER SYMPTOMS
HEARTBURN: 0
HEMOPTYSIS: 0
CHILLS: 0
BRUISES/BLEEDS EASILY: 0
HEADACHES: 0
SEIZURES: 0
PALPITATIONS: 0
WHEEZING: 0
BLOOD IN STOOL: 0
SORE THROAT: 0
TINGLING: 0
DOUBLE VISION: 0
SPUTUM PRODUCTION: 0
WEAKNESS: 0
ORTHOPNEA: 1
WEIGHT LOSS: 0
NECK PAIN: 0
SENSORY CHANGE: 0
COUGH: 0
TREMORS: 0
ABDOMINAL PAIN: 0
MYALGIAS: 0
BLURRED VISION: 0
SHORTNESS OF BREATH: 0
FEVER: 0
NAUSEA: 0
DIZZINESS: 0

## 2022-04-23 ASSESSMENT — FIBROSIS 4 INDEX: FIB4 SCORE: 2.13

## 2022-04-23 ASSESSMENT — PAIN DESCRIPTION - PAIN TYPE
TYPE: ACUTE PAIN
TYPE: ACUTE PAIN

## 2022-04-23 NOTE — PROGRESS NOTES
Assumed care of patient. Bedside report received from Michelle RIVAS. Updated POC, call light within reach, fall precautions in place. Bed locked, and in lowest position., no bed alarm, patient is up independently. A&Ox4, states 0/10 pain. Patient instructed to call for assistance. All questions answered, no further needs at this time.

## 2022-04-23 NOTE — CARE PLAN
The patient is Stable - Low risk of patient condition declining or worsening    Shift Goals  Clinical Goals: Monitor heart rate & heart rhythm  Patient Goals: Sleep, cluster care  Family Goals: CAT      Problem: Knowledge Deficit - Standard  Goal: Patient and family/care givers will demonstrate understanding of plan of care, disease process/condition, diagnostic tests and medications  Outcome: Progressing  Note: Patient educated on POC, verbalizes understanding. Will cluster care for patient preference.      Problem: Psychosocial  Goal: Patient's ability to verbalize feelings about condition will improve  Outcome: Progressing  Note: Patient encouraged to express feelings. Patient stated she did not receive much sleep/rest over day. Clustering care for preference.        Progress made toward(s) clinical / shift goals:  Progressing

## 2022-04-23 NOTE — PROGRESS NOTES
Daily Progress Note:     Date of Service: 4/23/2022  Primary Team: [facility] Team   Attending: SHERMAN Rodrigues   Senior Resident: Dr. Cabrera  Intern: Dr. Norah Salazar  Contact:  [phone]    ID:   51-year-old female with past medical history of asthma, hypothyroidism presented on 4/20/2022 with abdominal bloating and shortness of breath    Subjective/Interval:   No acute events reported overnight.  Patient is laying in bed comfortable.  Denies any shortness of breath, cough.  Patient states that she has orthopnea.  Overall patient states that she is feeling much better.  Patient has orders on mobile  since 2018 and more recently she has been staying encounter longer without mobilizing.  Other review of systems are nonsignificant.    Consultants/Specialty:  Pulmonology    Review of Systems   Constitutional: Negative for chills, fever and weight loss.   HENT: Negative for congestion, ear discharge and sore throat.    Eyes: Negative for blurred vision and double vision.   Respiratory: Negative for cough, hemoptysis, sputum production, shortness of breath and wheezing.    Cardiovascular: Positive for orthopnea. Negative for chest pain, palpitations and leg swelling.   Gastrointestinal: Negative for abdominal pain, blood in stool, heartburn and nausea.   Genitourinary: Negative for dysuria, frequency, hematuria and urgency.   Musculoskeletal: Negative for myalgias and neck pain.   Neurological: Negative for dizziness, tingling, tremors, sensory change, seizures, weakness and headaches.   Endo/Heme/Allergies: Does not bruise/bleed easily.       Objective Data:     Physical Exam:     Vitals:   Temp:  [36.7 °C (98.1 °F)-36.9 °C (98.4 °F)] 36.9 °C (98.4 °F)  Pulse:  [69-90] 90  Resp:  [17-18] 17  BP: (117-141)/(55-94) 141/94  SpO2:  [88 %-95 %] 88 %    Physical Exam  Constitutional:       General: She is not in acute distress.     Appearance: Normal appearance. She is obese. She is not  ill-appearing or toxic-appearing.   HENT:      Head: Normocephalic and atraumatic.      Nose: Nose normal. No congestion or rhinorrhea.      Mouth/Throat:      Mouth: Mucous membranes are moist.      Pharynx: No oropharyngeal exudate or posterior oropharyngeal erythema.   Eyes:      General: No scleral icterus.     Extraocular Movements: Extraocular movements intact.      Pupils: Pupils are equal, round, and reactive to light.   Cardiovascular:      Rate and Rhythm: Normal rate and regular rhythm.      Pulses: Normal pulses.      Heart sounds: Normal heart sounds. No murmur heard.  Pulmonary:      Effort: No respiratory distress.      Comments: Diminished breath sounds in bibasilar    Chest:      Chest wall: No tenderness.   Abdominal:      General: Abdomen is flat. Bowel sounds are normal. There is no distension.      Palpations: Abdomen is soft.      Tenderness: There is no abdominal tenderness. There is no guarding.      Comments: 2+ pitting edema the abdomen.  Epigastric area appears erythematous, no wound or discharge.   Musculoskeletal:         General: Swelling present. No tenderness. Normal range of motion.      Cervical back: Normal range of motion and neck supple.      Right lower leg: Edema present.      Left lower leg: Edema present.   Skin:     General: Skin is warm and dry.   Neurological:      General: No focal deficit present.      Mental Status: She is alert and oriented to person, place, and time.      Cranial Nerves: No cranial nerve deficit.      Motor: No weakness.      Coordination: Coordination normal.       Labs:   Most Recent Labs:    Lab Results   Component Value Date/Time    WBC 5.3 04/23/2022 01:19 AM    RBC 5.16 04/23/2022 01:19 AM    HEMOGLOBIN 10.7 (L) 04/23/2022 01:19 AM    HEMATOCRIT 38.5 04/23/2022 01:19 AM    MCV 74.6 (L) 04/23/2022 01:19 AM    MCH 20.7 (L) 04/23/2022 01:19 AM    MCHC 27.8 (L) 04/23/2022 01:19 AM    MPV 10.5 (H) 06/17/2010 03:54 PM    NEUTSPOLYS 67.80 04/23/2022  01:19 AM    LYMPHOCYTES 16.40 (L) 04/23/2022 01:19 AM    MONOCYTES 12.70 04/23/2022 01:19 AM    EOSINOPHILS 2.30 04/23/2022 01:19 AM    BASOPHILS 0.40 04/23/2022 01:19 AM    HYPOCHROMIA 1+ 04/20/2022 07:10 PM    ANISOCYTOSIS 1+ 04/20/2022 07:10 PM      Lab Results   Component Value Date/Time    SODIUM 138 04/23/2022 01:19 AM    POTASSIUM 4.4 04/23/2022 01:19 AM    CHLORIDE 101 04/23/2022 01:19 AM    CO2 29 04/23/2022 01:19 AM    GLUCOSE 78 04/23/2022 01:19 AM    BUN 31 (H) 04/23/2022 01:19 AM    CREATININE 1.12 04/23/2022 01:19 AM    CREATININE 0.9 04/08/2009 07:45 AM      Lab Results   Component Value Date/Time    ALTSGPT 17 04/23/2022 01:19 AM    ASTSGOT 22 04/23/2022 01:19 AM    ALKPHOSPHAT 122 (H) 04/23/2022 01:19 AM    TBILIRUBIN 1.2 04/23/2022 01:19 AM    DBILIRUBIN 1.3 (H) 04/21/2022 12:25 PM    ALBUMIN 3.3 04/23/2022 01:19 AM    GLOBULIN 3.7 (H) 04/23/2022 01:19 AM    INR 1.59 (H) 04/20/2022 09:41 PM    MACROCYTOSIS 1+ 04/20/2022 07:10 PM     Lab Results   Component Value Date/Time    PROTHROMBTM 18.5 (H) 04/20/2022 09:41 PM    INR 1.59 (H) 04/20/2022 09:41 PM          Imaging:   US-EXTREMITY VENOUS LOWER BILAT   Final Result      EC-ECHOCARDIOGRAM COMPLETE W/O CONT   Final Result      CT-CTA CHEST PULMONARY ARTERY W/ RECONS   Final Result         1.  Right lower lobe segmental and subsegmental pulmonary embolus. Right middle lobe and left lower lobe subsegmental pulmonary blood.   2.  Asymmetric enlargement of the right ventricle measuring 4.5 cm versus 2.9 cm for the left ventricle. Could represent component of right ventricular strain.   3.  Enlargement and the main pulmonary artery, consider pulmonary arterial hypertension.   4.  Hazy ground glass pulmonary opacities, appearance suggests edema and/or infiltrate.   5.  Ascites along the left abdominal wall   6.  Hepatomegaly with regular hepatic contour favoring changes of cirrhosis.   7.  Small pericardial effusion   8.  Atherosclerosis and  atherosclerotic coronary artery disease.      These findings were discussed with the patient's clinician, Shaan Davila, on 4/20/2022 9:01 PM.      CT-RENAL COLIC EVALUATION(A/P W/O)   Final Result         1.  Hazy groundglass opacities in the bilateral lung bases suggests edema or atypical infiltrates.   2.  Hepatomegaly and irregular hepatic contour favoring changes of cirrhosis.   3.  Scattered mild to moderate abdominal ascites.   4.  3.5 cm fat-containing umbilical hernia.   5.  Diverticulosis   6.  Subcutaneous fat stranding in the anterior abdominal wall, could represent lymphedema and/or cellulitis.      DX-CHEST-PORTABLE (1 VIEW)   Final Result      Cardiomegaly with diffuse interstitial prominence which may be secondary to edema or infiltrate.          Problem Representation:     * Pulmonary embolism and infarction (HCC)- (present on admission)  Assessment & Plan  Likely due to DVTs, causing pulmonary hypertension and asymmetric enlarged right ventricle.  CTA showed multiple segmental subsegmental pulmonary embolism.  Abhishek score 2.  Patient has had decreased mobility due to not being able to afford oxygen without insurance and needing oxygen 3 to 5 L when she ambulates.  Ultrasound of the lower extremities showed that the left lower extremity there is acute, occlusive deep venous thrombosis in the popliteal and posterior tibial veins.  TTE on 4/21/2022 showed ejection fraction 55% with severely dilated right ventricle and pulmonary arterial systolic blood pressure of 105 mmHg.  -Patient will need to continue Xarelto for at least 3 months.  -After 3 months patient will need to follow-up with D-dimer.  If D-dimer decreases, patient's Xarelto can be decreased to half dose for 3 to 6 months.  If patient is still symptomatic after 6 months then a repeat echo will be needed.  Patient may need to stay on Xarelto for for life.  -Keep keep saturation between 88 and 92%.  -Continue gentle diuresis bearing in mind  the patient has right ventricle systolic pressure of 105 mmHg. (Hold Lasix for SBP < 120). Will diurese patient for an additional day IV before transitioning to oral Lasix.   -I's and O's and Daily weights.  -Home O2 oxygen ordered.    Decompensated heart failure (HCC)  Assessment & Plan  Likely decompensated right heart failure due to pulmonary embolism..  CTA suggest pulmonary hypertension and congestive hepatopathy, with orthopnea.  Echo shows severely dilated right ventricle with RVSP of 105 mmHg.  -Cautious diuresis with Lasix due to high RVSP.    Acute respiratory failure with hypoxia (HCC)  Assessment & Plan  Likely due to pulmonary embolism and decompensated right heart failure.  -Keep oxygen requirement with saturation between 88 and 92%.  -See plan above.  -Continue IV diuresis for an additional day, plan to switch to oral Lasix tomorrow.    Hepatic fibrosis  Assessment & Plan  Likely hepatomegaly due to viral hepatitis versus NAFLD versus congestive hepatopathy.  -Follow viral hepatitis panel.    Hypothyroidism  Assessment & Plan  Patient reports she has a history of hypothyroidism  TSH is within normal limits  -No indication to start medications at this time.  -Follow-up with primary care physician as outpatient

## 2022-04-23 NOTE — PROGRESS NOTES
Daily Progress Note:     Date of Service: 4/22/2022  Primary Team: [facility] Team   Attending: SHERMAN Rodrigues   Senior Resident: Dr. Cabrera  Intern: Dr. Norah Salazar  Contact:  [phone]    ID:   51-year-old female with past medical history of asthma, hypothyroidism presented on 4/20/2022 with abdominal bloating and shortness of breath    Subjective/Interval:   No acute events reported overnight.  Patient denied any cough, shortness of breath, leg swelling.  Patient states she felt orthopnea but she rarely lays on her back.  Patient uses 3 L oxygen at home but has not been use for the past 3 to 5 years because she was uninsured.  Patient can only walk for a few steps.  She works as a [employer] .  On DVT PE there was left popliteal thrombosis.  Other review of systems were nonsignificant.    Consultants/Specialty:  Pulmonology    Review of Systems   Constitutional: Negative for chills, fever and weight loss.   Eyes: Negative for blurred vision and double vision.   Respiratory: Positive for shortness of breath. Negative for cough, hemoptysis, sputum production and wheezing.    Cardiovascular: Positive for orthopnea. Negative for chest pain, palpitations and leg swelling.   Gastrointestinal: Negative for abdominal pain, blood in stool, heartburn and nausea.   Genitourinary: Negative for dysuria, frequency, hematuria and urgency.   Musculoskeletal: Negative for myalgias and neck pain.   Neurological: Negative for dizziness, tingling, tremors, sensory change and headaches.   Endo/Heme/Allergies: Does not bruise/bleed easily.       Objective Data:     Physical Exam:     Vitals:   Temp:  [36.1 °C (97 °F)-36.8 °C (98.2 °F)] 36.7 °C (98.1 °F)  Pulse:  [69-95] 79  Resp:  [17-20] 18  BP: (100-127)/(59-86) 117/77  SpO2:  [90 %-98 %] 95 %    Physical Exam  Constitutional:       General: She is not in acute distress.     Appearance: Normal appearance. She is obese. She is not ill-appearing.   HENT:       Head: Normocephalic and atraumatic.      Nose: Nose normal. No congestion or rhinorrhea.      Mouth/Throat:      Mouth: Mucous membranes are moist.      Pharynx: No oropharyngeal exudate or posterior oropharyngeal erythema.   Eyes:      General: No scleral icterus.     Extraocular Movements: Extraocular movements intact.      Pupils: Pupils are equal, round, and reactive to light.   Cardiovascular:      Rate and Rhythm: Normal rate and regular rhythm.      Pulses: Normal pulses.      Heart sounds: Normal heart sounds. No murmur heard.  Pulmonary:      Effort: No respiratory distress.      Comments: Diminished breath sounds in bibasilar    Chest:      Chest wall: No tenderness.   Abdominal:      General: Abdomen is flat. Bowel sounds are normal. There is no distension.      Palpations: Abdomen is soft.      Tenderness: There is no abdominal tenderness. There is no guarding.      Comments: 3+ pitting edema the abdomen.  Epigastric area appears erythematous, no wound or discharge.   Musculoskeletal:         General: No swelling or tenderness. Normal range of motion.      Cervical back: Normal range of motion and neck supple.   Skin:     General: Skin is warm and dry.   Neurological:      General: No focal deficit present.      Mental Status: She is alert and oriented to person, place, and time.      Cranial Nerves: No cranial nerve deficit.      Motor: No weakness.      Coordination: Coordination normal.       Labs:   Most Recent Labs:    Lab Results   Component Value Date/Time    WBC 8.3 04/21/2022 12:17 AM    RBC 5.79 (H) 04/21/2022 12:17 AM    HEMOGLOBIN 12.3 04/21/2022 12:17 AM    HEMATOCRIT 42.1 04/21/2022 12:17 AM    MCV 72.7 (L) 04/21/2022 12:17 AM    MCH 21.2 (L) 04/21/2022 12:17 AM    MCHC 29.2 (L) 04/21/2022 12:17 AM    MPV 10.5 (H) 06/17/2010 03:54 PM    NEUTSPOLYS 73.80 (H) 04/21/2022 12:17 AM    LYMPHOCYTES 14.10 (L) 04/21/2022 12:17 AM    MONOCYTES 11.60 04/21/2022 12:17 AM    EOSINOPHILS 0.10 04/21/2022  12:17 AM    BASOPHILS 0.20 04/21/2022 12:17 AM    HYPOCHROMIA 1+ 04/20/2022 07:10 PM    ANISOCYTOSIS 1+ 04/20/2022 07:10 PM      Lab Results   Component Value Date/Time    SODIUM 134 (L) 04/22/2022 06:55 AM    POTASSIUM 4.0 04/22/2022 06:55 AM    CHLORIDE 99 04/22/2022 06:55 AM    CO2 25 04/22/2022 06:55 AM    GLUCOSE 91 04/22/2022 06:55 AM    BUN 28 (H) 04/22/2022 06:55 AM    CREATININE 0.94 04/22/2022 06:55 AM    CREATININE 0.9 04/08/2009 07:45 AM      Lab Results   Component Value Date/Time    ALTSGPT 18 04/22/2022 06:55 AM    ASTSGOT 25 04/22/2022 06:55 AM    ALKPHOSPHAT 128 (H) 04/22/2022 06:55 AM    TBILIRUBIN 1.5 04/22/2022 06:55 AM    DBILIRUBIN 1.3 (H) 04/21/2022 12:25 PM    ALBUMIN 3.4 04/22/2022 06:55 AM    GLOBULIN 3.8 (H) 04/22/2022 06:55 AM    INR 1.59 (H) 04/20/2022 09:41 PM    MACROCYTOSIS 1+ 04/20/2022 07:10 PM     Lab Results   Component Value Date/Time    PROTHROMBTM 18.5 (H) 04/20/2022 09:41 PM    INR 1.59 (H) 04/20/2022 09:41 PM          Imaging:   US-EXTREMITY VENOUS LOWER BILAT   Final Result      EC-ECHOCARDIOGRAM COMPLETE W/O CONT   Final Result      CT-CTA CHEST PULMONARY ARTERY W/ RECONS   Final Result         1.  Right lower lobe segmental and subsegmental pulmonary embolus. Right middle lobe and left lower lobe subsegmental pulmonary blood.   2.  Asymmetric enlargement of the right ventricle measuring 4.5 cm versus 2.9 cm for the left ventricle. Could represent component of right ventricular strain.   3.  Enlargement and the main pulmonary artery, consider pulmonary arterial hypertension.   4.  Hazy ground glass pulmonary opacities, appearance suggests edema and/or infiltrate.   5.  Ascites along the left abdominal wall   6.  Hepatomegaly with regular hepatic contour favoring changes of cirrhosis.   7.  Small pericardial effusion   8.  Atherosclerosis and atherosclerotic coronary artery disease.      These findings were discussed with the patient's clinician, Shaan Davila, on  4/20/2022 9:01 PM.      CT-RENAL COLIC EVALUATION(A/P W/O)   Final Result         1.  Hazy groundglass opacities in the bilateral lung bases suggests edema or atypical infiltrates.   2.  Hepatomegaly and irregular hepatic contour favoring changes of cirrhosis.   3.  Scattered mild to moderate abdominal ascites.   4.  3.5 cm fat-containing umbilical hernia.   5.  Diverticulosis   6.  Subcutaneous fat stranding in the anterior abdominal wall, could represent lymphedema and/or cellulitis.      DX-CHEST-PORTABLE (1 VIEW)   Final Result      Cardiomegaly with diffuse interstitial prominence which may be secondary to edema or infiltrate.          Problem Representation:     * Pulmonary embolism and infarction (HCC)- (present on admission)  Assessment & Plan  Likely due to DVTs, causing pulmonary hypertension and asymmetric enlarged right ventricle.  CTA showed multiple segmental subsegmental pulmonary embolism.  Abhishek score 2.  Patient has had decreased mobility due to not being able to afford oxygen without insurance and needing oxygen 3 to 5 L when she ambulates.  Ultrasound of the lower extremities showed that the left lower extremity there is acute, occlusive deep venous thrombosis in the popliteal and posterior tibial veins.  TTE on 4/21/2022 showed ejection fraction 55% with severely dilated right ventricle and pulmonary arterial systolic blood pressure of 105 mmHg.  -Patient will need to continue Xarelto for at least 3 months.  -After 3 months patient will need to follow-up with D-dimer.  If D-dimer decreases, patient's Xarelto can be decreased to half dose for 3 to 6 months.  If patient is still symptomatic after 6 months then a repeat echo will be needed.  Patient may need to stay on Xarelto for for life.  -Keep keep saturation between 88 and 92%.  -Continue gentle diuresis bearing in mind the patient has right ventricle systolic pressure of 105 mmHg.  -I's and O's and Daily weights.  -Home O2 oxygen  ordered.    Decompensated heart failure (HCC)  Assessment & Plan  Likely decompensated right heart failure due to pulmonary embolism..  CTA suggest pulmonary hypertension and congestive hepatopathy, with orthopnea.  Echo shows severely dilated right ventricle with RVSP of 105 mmHg.  -Cautious diuresis with Lasix due to high RVSP.    Acute respiratory failure with hypoxia (HCC)  Assessment & Plan  Likely due to pulmonary embolism and decompensated right heart failure.  -Keep oxygen requirement with saturation between 88 and 92%.  -See plan above.  -Gentle diuresis.    Hepatic fibrosis  Assessment & Plan  Likely hepatomegaly due to viral hepatitis versus NAFLD versus congestive hepatopathy.  -Follow viral hepatitis panel.    Hypothyroidism  Assessment & Plan  Patient reports she has a history of hypothyroidism  TSH is within normal limits  -No indication to start medications at this time.  -Follow-up with primary care physician as outpatient

## 2022-04-23 NOTE — CARE PLAN
Problem: Pain - Standard  Goal: Alleviation of pain or a reduction in pain to the patient’s comfort goal  Outcome: Progressing     Problem: Fall Risk  Goal: Patient will remain free from falls  Outcome: Progressing     Problem: Psychosocial  Goal: Patient's ability to verbalize feelings about condition will improve  Outcome: Progressing   The patient is Stable - Low risk of patient condition declining or worsening    Shift Goals  Clinical Goals: DC  Patient Goals: Sleep, cluster care  Family Goals: CAT    Progress made toward(s) clinical / shift goals:  progressing    Patient is not progressing towards the following goals:

## 2022-04-24 ENCOUNTER — TELEPHONE (OUTPATIENT)
Dept: SCHEDULING | Facility: IMAGING CENTER | Age: 52
End: 2022-04-24
Payer: MEDICAID

## 2022-04-24 ENCOUNTER — PHARMACY VISIT (OUTPATIENT)
Dept: PHARMACY | Facility: MEDICAL CENTER | Age: 52
End: 2022-04-24
Payer: COMMERCIAL

## 2022-04-24 VITALS
HEART RATE: 81 BPM | BODY MASS INDEX: 53.92 KG/M2 | TEMPERATURE: 98.1 F | DIASTOLIC BLOOD PRESSURE: 74 MMHG | OXYGEN SATURATION: 95 % | HEIGHT: 62 IN | SYSTOLIC BLOOD PRESSURE: 112 MMHG | WEIGHT: 293 LBS | RESPIRATION RATE: 18 BRPM

## 2022-04-24 LAB
A2 MACROGLOB SERPL-MCNC: 167 MG/DL (ref 131–293)
ALBUMIN SERPL BCP-MCNC: 3.3 G/DL (ref 3.2–4.9)
ALBUMIN/GLOB SERPL: 0.8 G/DL
ALP SERPL-CCNC: 128 U/L (ref 30–99)
ALT SERPL-CCNC: 17 U/L (ref 2–50)
ALT SERPL-CCNC: 22 U/L (ref 5–40)
ANION GAP SERPL CALC-SCNC: 11 MMOL/L (ref 7–16)
ANNOTATION COMMENT IMP: ABNORMAL
AST SERPL-CCNC: 26 U/L (ref 12–45)
AST SERPL-CCNC: 30 U/L (ref 9–40)
BASOPHILS # BLD AUTO: 0.4 % (ref 0–1.8)
BASOPHILS # BLD: 0.02 K/UL (ref 0–0.12)
BILIRUB SERPL-MCNC: 1.2 MG/DL (ref 0.1–1.5)
BUN SERPL-MCNC: 25 MG/DL (ref 8–22)
BUN SERPL-SCNC: 28 MG/DL (ref 7–20)
CALCIUM SERPL-MCNC: 8.6 MG/DL (ref 8.5–10.5)
CHLORIDE SERPL-SCNC: 96 MMOL/L (ref 96–112)
CIRRHOMETER PT SCORE Q4850: 0.51
CO2 SERPL-SCNC: 28 MMOL/L (ref 20–33)
CREAT SERPL-MCNC: 0.7 MG/DL (ref 0.5–1.4)
EOSINOPHIL # BLD AUTO: 0.11 K/UL (ref 0–0.51)
EOSINOPHIL NFR BLD: 2 % (ref 0–6.9)
ERYTHROCYTE [DISTWIDTH] IN BLOOD BY AUTOMATED COUNT: 59.6 FL (ref 35.9–50)
FIBROSIS STAGE SERPL QL: ABNORMAL
GFR SERPLBLD CREATININE-BSD FMLA CKD-EPI: 103 ML/MIN/1.73 M 2
GGT SERPL-CCNC: 29 U/L (ref 7–33)
GLOBULIN SER CALC-MCNC: 4.2 G/DL (ref 1.9–3.5)
GLUCOSE SERPL-MCNC: 91 MG/DL (ref 65–99)
HCT VFR BLD AUTO: 40.4 % (ref 37–47)
HGB BLD-MCNC: 11.3 G/DL (ref 12–16)
IMM GRANULOCYTES # BLD AUTO: 0.03 K/UL (ref 0–0.11)
IMM GRANULOCYTES NFR BLD AUTO: 0.5 % (ref 0–0.9)
INFLAMETER META CLASS Q4853: ABNORMAL
INFLAMETER PT SCORE Q4852: 0.58
LIVER FIBR SCORE SERPL CALC.FIBROMETER: 0.64
LYMPHOCYTES # BLD AUTO: 0.89 K/UL (ref 1–4.8)
LYMPHOCYTES NFR BLD: 15.9 % (ref 22–41)
MAGNESIUM SERPL-MCNC: 1.7 MG/DL (ref 1.5–2.5)
MCH RBC QN AUTO: 21 PG (ref 27–33)
MCHC RBC AUTO-ENTMCNC: 28 G/DL (ref 33.6–35)
MCV RBC AUTO: 75.1 FL (ref 81.4–97.8)
MONOCYTES # BLD AUTO: 0.52 K/UL (ref 0–0.85)
MONOCYTES NFR BLD AUTO: 9.3 % (ref 0–13.4)
NEUTROPHILS # BLD AUTO: 4.01 K/UL (ref 2–7.15)
NEUTROPHILS NFR BLD: 71.9 % (ref 44–72)
NRBC # BLD AUTO: 0 K/UL
NRBC BLD-RTO: 0 /100 WBC
PATHOLOGY STUDY: ABNORMAL
PLATELET # BLD AUTO: 162 K/UL (ref 164–446)
POTASSIUM SERPL-SCNC: 3.9 MMOL/L (ref 3.6–5.5)
PROT SERPL-MCNC: 7.5 G/DL (ref 6–8.2)
PROTHROM ACT/NOR PPP: 49 % (ref 90–120)
RBC # BLD AUTO: 5.38 M/UL (ref 4.2–5.4)
SODIUM SERPL-SCNC: 135 MMOL/L (ref 135–145)
WBC # BLD AUTO: 5.6 K/UL (ref 4.8–10.8)

## 2022-04-24 PROCEDURE — A9270 NON-COVERED ITEM OR SERVICE: HCPCS | Performed by: STUDENT IN AN ORGANIZED HEALTH CARE EDUCATION/TRAINING PROGRAM

## 2022-04-24 PROCEDURE — 700111 HCHG RX REV CODE 636 W/ 250 OVERRIDE (IP): Performed by: STUDENT IN AN ORGANIZED HEALTH CARE EDUCATION/TRAINING PROGRAM

## 2022-04-24 PROCEDURE — RXMED WILLOW AMBULATORY MEDICATION CHARGE: Performed by: STUDENT IN AN ORGANIZED HEALTH CARE EDUCATION/TRAINING PROGRAM

## 2022-04-24 PROCEDURE — 85025 COMPLETE CBC W/AUTO DIFF WBC: CPT

## 2022-04-24 PROCEDURE — 83735 ASSAY OF MAGNESIUM: CPT

## 2022-04-24 PROCEDURE — 99238 HOSP IP/OBS DSCHRG MGMT 30/<: CPT | Performed by: INTERNAL MEDICINE

## 2022-04-24 PROCEDURE — 36415 COLL VENOUS BLD VENIPUNCTURE: CPT

## 2022-04-24 PROCEDURE — 700102 HCHG RX REV CODE 250 W/ 637 OVERRIDE(OP): Performed by: STUDENT IN AN ORGANIZED HEALTH CARE EDUCATION/TRAINING PROGRAM

## 2022-04-24 PROCEDURE — 80053 COMPREHEN METABOLIC PANEL: CPT

## 2022-04-24 RX ORDER — MAGNESIUM SULFATE HEPTAHYDRATE 40 MG/ML
4 INJECTION, SOLUTION INTRAVENOUS ONCE
Status: COMPLETED | OUTPATIENT
Start: 2022-04-24 | End: 2022-04-24

## 2022-04-24 RX ORDER — ATORVASTATIN CALCIUM 20 MG/1
20 TABLET, FILM COATED ORAL EVERY EVENING
Qty: 30 TABLET | Refills: 2 | Status: SHIPPED | OUTPATIENT
Start: 2022-04-24

## 2022-04-24 RX ORDER — IPRATROPIUM BROMIDE AND ALBUTEROL SULFATE 2.5; .5 MG/3ML; MG/3ML
3 SOLUTION RESPIRATORY (INHALATION) EVERY 4 HOURS PRN
Qty: 30 EACH | Refills: 2 | Status: SHIPPED | OUTPATIENT
Start: 2022-04-24 | End: 2022-05-24

## 2022-04-24 RX ORDER — NEBULIZER AND COMPRESSOR
1 EACH MISCELLANEOUS 2 TIMES DAILY
Qty: 1 KIT | Refills: 0 | Status: SHIPPED | OUTPATIENT
Start: 2022-04-24

## 2022-04-24 RX ORDER — FUROSEMIDE 40 MG/1
40 TABLET ORAL DAILY
Qty: 7 TABLET | Refills: 0 | Status: SHIPPED | OUTPATIENT
Start: 2022-04-24 | End: 2022-05-01

## 2022-04-24 RX ORDER — POTASSIUM CHLORIDE 20 MEQ/1
10 TABLET, EXTENDED RELEASE ORAL ONCE
Status: COMPLETED | OUTPATIENT
Start: 2022-04-24 | End: 2022-04-24

## 2022-04-24 RX ADMIN — RIVAROXABAN 15 MG: 15 TABLET, FILM COATED ORAL at 06:13

## 2022-04-24 RX ADMIN — SENNOSIDES AND DOCUSATE SODIUM 2 TABLET: 50; 8.6 TABLET ORAL at 06:13

## 2022-04-24 RX ADMIN — MAGNESIUM SULFATE HEPTAHYDRATE 4 G: 40 INJECTION, SOLUTION INTRAVENOUS at 07:06

## 2022-04-24 RX ADMIN — POTASSIUM CHLORIDE 10 MEQ: 20 TABLET, EXTENDED RELEASE ORAL at 07:06

## 2022-04-24 NOTE — DISCHARGE PLANNING
Meds-to-Beds: Discharge prescription orders listed below delivered to patient's bedside. RN notified. Patient counseled.      Medication Sig Dispense Refill   • atorvastatin (LIPITOR) 20 MG Tab Take 1 Tablet by mouth every evening. 30 Tablet 2   • ipratropium-albuterol (DUONEB) 0.5-2.5 (3) MG/3ML nebulizer solution Take 3 mL by nebulization every four hours as needed for Shortness of Breath for up to 30 days. 30 Each 2   • furosemide (LASIX) 40 MG Tab Take 1 Tablet by mouth every day for 7 days.Do NOT take if systolic blood pressure below 120. 7 Tablet 0   • Rivaroxaban (XARELTO STARTER PACK) 15 & 20 MG Tablet Therapy Pack Take 15 mg by mouth 2 times a day for 21 days, then 20 mg daily for 9 days 51 Each 0   • rivaroxaban (XARELTO) 20 MG Tab tablet Take 1 Tablet by mouth with dinner. 60 Tablet 0      Meron Garber, Pharmacy Intern

## 2022-04-24 NOTE — PROGRESS NOTES
Assumed care of patient. Bedside updates received from Obed RIVAS. Updated POC, call light within reach, fall precautions in place. Bed locked, and in lowest position, patient up independently. Patient is A&Ox4, states 0/10 pain. Patient instructed to call for assistance. All questions answered, no further needs at this time.

## 2022-04-24 NOTE — CARE PLAN
The patient is Stable - Low risk of patient condition declining or worsening    Shift Goals  Clinical Goals: DC  Patient Goals: Sleep, cluster care  Family Goals: CAT      Problem: Knowledge Deficit - Standard  Goal: Patient and family/care givers will demonstrate understanding of plan of care, disease process/condition, diagnostic tests and medications  Outcome: Progressing  Note: Patient updated on POC, verbalizes understanding.      Problem: Psychosocial  Goal: Patient's ability to verbalize feelings about condition will improve  Outcome: Progressing  Note: Patient encouraged to express feelings.        Progress made toward(s) clinical / shift goals:  Progressing

## 2022-04-24 NOTE — DISCHARGE SUMMARY
Discharge Summary    Date of Admission: 4/20/2022  Date of Discharge: 4/24/2022  Discharging Attending: SHERMAN Rodrigues   Discharging Senior Resident: Dr. Henrique Wheeler  Discharging Intern: Dr. Norah Salazar    CHIEF COMPLAINT ON ADMISSION  Chief Complaint   Patient presents with   • Shortness of Breath     X 'few months', hx COPD but SOB worse now, pt has rx for home O2 but does not have any r/t insurance, denies cardiac hx or chest pain, states chronic cough with minimal clear phlegm, denies fevers   • Abdominal Swelling     Pt states abd distension X 3-4 months, denies hx of such, denies liver hx, states some mild constipation, post-menopausal with tubal ligation, denies urinary or stool changes, intermittent nausea but no vomiting        Reason for Admission  Abdominal bloating and shortness of breath    Admission Date  4/20/2022    CODE STATUS  Full Code    HPI & HOSPITAL COURSE  This is a 51 y.o. female with past medical history of asthma, hypothyroidism here on 4/20/2022 with abdominal bloating and shortness of breath.    #Pulmonary embolism and infarction.  This was likely due to deep vein thrombosis in the left lower extremity as seen on ultrasound which caused pulmonary embolism causing pulmonary hypertension and asymmetric enlarged right ventricle.  CTA showed multiple segmental and subsegmental pulmonary embolisms.  Abhishek score was 2.  Patient has a history of decreased mobility due to not being able to afford oxygen because she did not have insurance.  Her oxygen baseline was 3 to 5 L when she ambulates.  Patient did not have insurance for the past 3 years, therefore she was not using oxygen as instructed.  Ultrasound showed that the left lower extremity there is acute, occlusive deep vein thrombosis in the popliteal and posterior tibial veins.  TTE on 4/21/2022 showed left ventricle ejection fraction of 55% with severely dilated right ventricle and pulmonary arterial systolic blood pressure of  105 mmHg.  Patient was started on Xarelto and will need to continue on Xarelto for at least 3 months.  After 3 months patient will need to follow-up with D-dimer.  If D-dimer decreases, patient's Xarelto can be decreased to half a dose for another 3 to 6 months.  If patient is still symptomatic after 6 months then a repeat echocardiogram will be needed.  Patient may need to stay on Xarelto for life depending on the course of her disease.  Patient saturation was kept between 88 and 92%.  Patient was continued on gentle diuresis given that the patient has right ventricular systolic pressure of 105 mmHg.  Lasix was held for systolic blood pressure less than 120.  Patient will be discharged with 40 mg oral Lasix for the next 7 days and was instructed to not take Lasix if her systolic blood pressure is less than 120.  Prescription for blood pressure cuff monitoring device was written and patient stated that she will monitor her blood pressure regularly before taking Lasix daily.  Patient also will follow-up with her primary care physician in 7 days.  Home oxygen was also ordered for patient.    #Decompensated heart failure.  This is due to pulmonary embolism, which was seen on CTA .  Cautious diuresis with Lasix due to high RVSP of 105 mmHg was administered.  Patient ins and outs were monitored daily.    #Acute respiratory failure with hypoxia.  This was likely due to pulmonary embolism and decompensated right heart failure.  Oxygen requirement was given and saturation was kept between 88 and 92%.  Patient was continued on IV diuresis, blood pressure was monitored during diuresis.  Patient was not diuresed if systolic blood pressure was below 120.    #Hepatic fibrosis.  Likely congestive hepatopathy due to right heart failure versus viral hepatitis versus NAFLD.  Viral hepatitis is still pending, it will need to be followed as outpatient with primary care physician.    #Hypothyroidism.  Patient reported to have a history  of hypothyroidism.  TSH was within normal limits.  There was no indication to start any medications.  Patient will need to follow-up with her primary care physician for further evaluation.     Time spent discharging this patient was 40 minutes.    Therefore, she is discharged in good and stable condition to home with close outpatient follow-up.    The patient met 2-midnight criteria for an inpatient stay at the time of discharge.     Review of Systems   Constitutional: Negative for chills, fever and weight loss.   HENT: Negative for congestion, ear discharge and sore throat.    Eyes: Negative for blurred vision and double vision.   Respiratory: Negative for cough, hemoptysis, sputum production, shortness of breath and wheezing.    Cardiovascular: Positive for orthopnea. Negative for chest pain, palpitations and leg swelling.   Gastrointestinal: Negative for abdominal pain, blood in stool, heartburn and nausea.   Genitourinary: Negative for dysuria, frequency, hematuria and urgency.   Musculoskeletal: Negative for myalgias and neck pain.   Neurological: Negative for dizziness, tingling, tremors, sensory change, seizures, weakness and headaches.   Endo/Heme/Allergies: Does not bruise/bleed easily.     PHYSICAL EXAM ON DISCHARGE  Temp:  [36.3 °C (97.3 °F)-37 °C (98.6 °F)] 36.7 °C (98.1 °F)  Pulse:  [74-88] 81  Resp:  [16-18] 18  BP: ()/() 112/74  SpO2:  [92 %-98 %] 95 %    Physical Exam  Constitutional:       General: She is not in acute distress.     Appearance: Normal appearance. She is not ill-appearing or toxic-appearing.   HENT:      Head: Normocephalic and atraumatic.      Nose: Nose normal. No congestion or rhinorrhea.   Eyes:      Extraocular Movements: Extraocular movements intact.      Pupils: Pupils are equal, round, and reactive to light.   Cardiovascular:      Rate and Rhythm: Normal rate and regular rhythm.      Pulses: Normal pulses.      Heart sounds: No murmur heard.    No friction rub. No  gallop.   Pulmonary:      Effort: Pulmonary effort is normal.      Breath sounds: No stridor. No wheezing or rales.   Abdominal:      General: Abdomen is flat. There is no distension.      Palpations: There is no mass.      Tenderness: There is no abdominal tenderness. There is no guarding.      Hernia: No hernia is present.      Comments: Slightly erythematous   Musculoskeletal:         General: Swelling present.      Cervical back: No rigidity.      Right lower leg: Edema (+1) present.      Left lower leg: Edema (+1) present.   Skin:     General: Skin is warm and dry.      Capillary Refill: Capillary refill takes less than 2 seconds.      Coloration: Skin is not jaundiced or pale.   Neurological:      General: No focal deficit present.      Mental Status: She is alert and oriented to person, place, and time. Mental status is at baseline.      Cranial Nerves: No cranial nerve deficit.      Sensory: No sensory deficit.      Motor: No weakness.      Gait: Gait normal.       Discharge Date  4/24/2022    FOLLOW UP ITEMS POST DISCHARGE  Follow-up with primary care physician in a week.  Follow-up with pulmonologist in 3 months.    DISCHARGE DIAGNOSES  Principal Problem:    Pulmonary embolism and infarction (HCC) POA: Yes  Active Problems:    Acute respiratory failure with hypoxia (HCC) POA: Unknown    Decompensated heart failure (HCC) POA: Unknown    Hepatic fibrosis POA: Unknown    Hypothyroidism POA: Unknown  Resolved Problems:    Cirrhosis (HCC) POA: Unknown    Hepatic steatosis POA: Unknown      FOLLOW UP  No future appointments.  [facility] ([facility]) - Primary Care  [address] [place] [id number]  [phone]  On 4/29/2022  Please arrive as a walk in appt @ 9am to recieve immediate follow up care for your heart failure diagnosis. Thank you    [facility]  [address] [place] [id number]  [phone]  In 1 week        MEDICATIONS ON DISCHARGE     Medication List      START taking  these medications      Instructions   Adult Blood Pressure Cuff Lg Kit   1 Each 2 times a day.  Dose: 1 Each     atorvastatin 20 MG Tabs  Commonly known as: LIPITOR   Take 1 Tablet by mouth every evening.  Dose: 20 mg     furosemide 40 MG Tabs  Commonly known as: LASIX   Doctor's comments: Do NOT take if systolic blood pressure below 120.  Take 1 Tablet by mouth every day for 7 days.Do NOT take if systolic blood pressure below 120.  Dose: 40 mg     ipratropium-albuterol 0.5-2.5 (3) MG/3ML nebulizer solution  Commonly known as: DUONEB   Take 3 mL by nebulization every four hours as needed for Shortness of Breath for up to 30 days.  Dose: 3 mL     * Xarelto Starter Pack 15 & 20 MG Tbpk  Generic drug: Rivaroxaban   Take 15 mg by mouth 2 times a day for 21 days, then 20 mg daily for 9 days  Dose: 1 Package     * rivaroxaban 20 MG Tabs tablet  Commonly known as: XARELTO   Take 1 Tablet by mouth with dinner.  Dose: 20 mg         * This list has 2 medication(s) that are the same as other medications prescribed for you. Read the directions carefully, and ask your doctor or other care provider to review them with you.            STOP taking these medications    acetaminophen 500 MG Tabs  Commonly known as: TYLENOL            Allergies  No Known Allergies    DIET  Orders Placed This Encounter   Procedures   • Diet Order Diet: Consistent CHO (Diabetic); Second Modifier: (optional): Cardiac     Standing Status:   Standing     Number of Occurrences:   1     Order Specific Question:   Diet:     Answer:   Consistent CHO (Diabetic) [4]     Order Specific Question:   Second Modifier: (optional)     Answer:   Cardiac [6]       ACTIVITY  As tolerated.  Weight bearing as tolerated    CONSULTATIONS  Pulmonology    PROCEDURES  None

## 2022-04-24 NOTE — DISCHARGE INSTRUCTIONS
-Please take all your medications as instructed, especially Xarelto, very important to prevent clot propagation in your lungs.  -Please take Lasix 40 oral daily only if systolic blood pressure is above 120 for the next 7 days. Do NOT take lasix if your blood pressure is below 120.  -Please follow up with your primary care physician in 7 days.  -Please continue Xarelto and follow up with primary care physician in 3 months and you also need to follow up with lung specialist (pulmonologist) in 3 months.  Do not stop taking Xarelto regularly unless instructed by a physician.  -Please seek medical care at the nearest emergency if you have worrisome signs.      Discharge Instructions    Discharged to home by car with friend. Discharged via wheelchair, hospital escort: Yes.  Special equipment needed: Not Applicable    Be sure to schedule a follow-up appointment with your primary care doctor or any specialists as instructed.     Discharge Plan:   Diet Plan: Discussed  Activity Level: Discussed  Confirmed Follow up Appointment: Patient to Call and Schedule Appointment  Confirmed Symptoms Management: Discussed  Medication Reconciliation Updated: Yes    I understand that a diet low in cholesterol, fat, and sodium is recommended for good health. Unless I have been given specific instructions below for another diet, I accept this instruction as my diet prescription.   Other diet: Cardiac, Diabetic    Special Instructions:   HF Patient Discharge Instructions  · Monitor your weight daily, and maintain a weight chart, to track your weight changes.   · Activity as tolerated, unless your Doctor has ordered otherwise. Other activity order: as tolerated.  · Follow a low fat, low cholesterol, low salt diet unless instructed otherwise by your Doctor. Read the labels on the back of food products and track your intake of fat, cholesterol and salt.   · Fluid Restriction No. If a Fluid Restriction has been ordered by your Doctor, measure  fluids with a measuring cup to ensure that you are not exceeding the restriction.   · No smoking.  · Oxygen No. If your Doctor has ordered that you wear Oxygen at home, it is important to wear it as ordered.  · Did you receive an explanation from staff on the importance of taking each of your medications and why it is necessary to keep taking them unless your doctor says to stop? Yes  · Were all of your questions answered about how to manage your heart failure and what to do if you have increased signs and symptoms after you go home? Yes  · Do you feel like your heart failure care team involved you in the care treatment plan and allowed you to make decisions regarding your care while in the hospital and addressed any discharge needs you might have? Yes    See the educational handout provided at discharge for more information on monitoring your daily weight, activity and diet. This also explains more about Heart Failure, symptoms of a flare-up and some of the tests that you have undergone.     Warning Signs of a Flare-Up include:  · Swelling in the ankles or lower legs.  · Shortness of breath, while at rest, or while doing normal activities.   · Shortness of breath at night when in bed, or coughing in bed.   · Requiring more pillows to sleep at night, or needing to sit up at night to sleep.  · Feeling weak, dizzy or fatigued.     When to call your Doctor:  · Call [facility] seven days a week from 8:00 a.m. to 8:00 p.m. for medical questions [phone].  · Call your Primary Care Physician or Cardiologist if:   1. You experience any pain radiating to your jaw or neck.  2. You have any difficulty breathing.  3. You experience weight gain of 3 lbs in a day or 5 lbs in a week.   4. You feel any palpitations or irregular heartbeats.  5. You become dizzy or lose consciousness.   If you have had an angiogram or had a pacemaker or AICD placed, and experience:  1. Bleeding, drainage or swelling at the surgical /  puncture site.  2. Fever greater than 100.0 F  3. Shock from internal defibrillator.  4. Cool and / or numb extremities.      · Is patient discharged on Warfarin / Coumadin?   No     Depression / Suicide Risk    As you are discharged from this [facility] facility, it is important to learn how to keep safe from harming yourself.    Recognize the warning signs:  · Abrupt changes in personality, positive or negative- including increase in energy   · Giving away possessions  · Change in eating patterns- significant weight changes-  positive or negative  · Change in sleeping patterns- unable to sleep or sleeping all the time   · Unwillingness or inability to communicate  · Depression  · Unusual sadness, discouragement and loneliness  · Talk of wanting to die  · Neglect of personal appearance   · Rebelliousness- reckless behavior  · Withdrawal from people/activities they love  · Confusion- inability to concentrate     If you or a loved one observes any of these behaviors or has concerns about self-harm, here's what you can do:  · Talk about it- your feelings and reasons for harming yourself  · Remove any means that you might use to hurt yourself (examples: pills, rope, extension cords, firearm)  · Get professional help from the community (Mental Health, Substance Abuse, psychological counseling)  · Do not be alone:Call your Safe Contact- someone whom you trust who will be there for you.  · Call your local CRISIS HOTLINE 525-9891 or 969-956-8125  · Call your local Children's Mobile Crisis Response Team [place] (377) 527-2207 or www.FNZ  · Call the toll free National Suicide Prevention Hotlines   · National Suicide Prevention Lifeline 520-152-FCYP (2418)  · National Hope Line Network 800-SUICIDE (995-8109)      Pulmonary Embolism    A pulmonary embolism (PE) is a sudden blockage or decrease of blood flow in one or both lungs. Most blockages come from a blood clot that forms in the vein of a lower leg,  thigh, or arm (deep vein thrombosis, DVT) and travels to the lungs. A clot is blood that has thickened into a gel or solid. PE is a dangerous and life-threatening condition that needs to be treated right away.  What are the causes?  This condition is usually caused by a blood clot that forms in a vein and moves to the lungs. In rare cases, it may be caused by air, fat, part of a tumor, or other tissue that moves through the veins and into the lungs.  What increases the risk?  The following factors may make you more likely to develop this condition:  · Experiencing a traumatic injury, such as breaking a hip or leg.  · Having:  ? A spinal cord injury.  ? Orthopedic surgery, especially hip or knee replacement.  ? Any major surgery.  ? A stroke.  ? DVT.  ? Blood clots or blood clotting disease.  ? Long-term (chronic) lung or heart disease.  ? Cancer treated with chemotherapy.  ? A central venous catheter.  · Taking medicines that contain estrogen. These include birth control pills and hormone replacement therapy.  · Being:  ? Pregnant.  ? In the period of time after your baby is delivered (postpartum).  ? Older than age 60.  ? Overweight.  ? A smoker, especially if you have other risks.  What are the signs or symptoms?  Symptoms of this condition usually start suddenly and include:  · Shortness of breath during activity or at rest.  · Coughing, coughing up blood, or coughing up blood-tinged mucus.  · Chest pain that is often worse with deep breaths.  · Rapid or irregular heartbeat.  · Feeling light-headed or dizzy.  · Fainting.  · Feeling anxious.  · Fever.  · Sweating.  · Pain and swelling in a leg. This is a symptom of DVT, which can lead to PE.  How is this diagnosed?  This condition may be diagnosed based on:  · Your medical history.  · A physical exam.  · Blood tests.  · CT pulmonary angiogram. This test checks blood flow in and around your lungs.  · Ventilation-perfusion scan, also called a lung VQ scan. This test  measures air flow and blood flow to the lungs.  · An ultrasound of the legs.  How is this treated?  Treatment for this condition depends on many factors, such as the cause of your PE, your risk for bleeding or developing more clots, and other medical conditions you have. Treatment aims to remove, dissolve, or stop blood clots from forming or growing larger. Treatment may include:  · Medicines, such as:  ? Blood thinning medicines (anticoagulants) to stop clots from forming.  ? Medicines that dissolve clots (thrombolytics).  · Procedures, such as:  ? Using a flexible tube to remove a blood clot (embolectomy) or to deliver medicine to destroy it (catheter-directed thrombolysis).  ? Inserting a filter into a large vein that carries blood to the heart (inferior vena cava). This filter (vena cava filter) catches blood clots before they reach the lungs.  ? Surgery to remove the clot (surgical embolectomy). This is rare.  You may need a combination of immediate, long-term (up to 3 months after diagnosis), and extended (more than 3 months after diagnosis) treatments. Your treatment may continue for several months (maintenance therapy). You and your health care provider will work together to choose the treatment program that is best for you.  Follow these instructions at home:  Medicines  · Take over-the-counter and prescription medicines only as told by your health care provider.  · If you are taking an anticoagulant medicine:  ? Take the medicine every day at the same time each day.  ? Understand what foods and drugs interact with your medicine.  ? Understand the side effects of this medicine, including excessive bruising or bleeding. Ask your health care provider or pharmacist about other side effects.  General instructions  · Wear a medical alert bracelet or carry a medical alert card that says you have had a PE and lists what medicines you take.  · Ask your health care provider when you may return to your normal  activities. Avoid sitting or lying for a long time without moving.  · Maintain a healthy weight. Ask your health care provider what weight is healthy for you.  · Do not use any products that contain nicotine or tobacco, such as cigarettes, e-cigarettes, and chewing tobacco. If you need help quitting, ask your health care provider.  · Talk with your health care provider about any travel plans. It is important to make sure that you are still able to take your medicine while on trips.  · Keep all follow-up visits as told by your health care provider. This is important.  Contact a health care provider if:  · You missed a dose of your blood thinner medicine.  Get help right away if:  · You have:  ? New or increased pain, swelling, warmth, or redness in an arm or leg.  ? Numbness or tingling in an arm or leg.  ? Shortness of breath during activity or at rest.  ? A fever.  ? Chest pain.  ? A rapid or irregular heartbeat.  ? A severe headache.  ? Vision changes.  ? A serious fall or accident, or you hit your head.  ? Stomach (abdominal) pain.  ? Blood in your vomit, stool, or urine.  ? A cut that will not stop bleeding.  · You cough up blood.  · You feel light-headed or dizzy.  · You cannot move your arms or legs.  · You are confused or have memory loss.  These symptoms may represent a serious problem that is an emergency. Do not wait to see if the symptoms will go away. Get medical help right away. Call your local emergency services (911 in the [place] Do not drive yourself to the hospital.  Summary  · A pulmonary embolism (PE) is a sudden blockage or decrease of blood flow in one or both lungs. PE is a dangerous and life-threatening condition that needs to be treated right away.  · Treatments for this condition usually include medicines to thin your blood (anticoagulants) or medicines to break apart blood clots (thrombolytics).  · If you are given blood thinners, it is important to take the medicine every day at the same  time each day.  · Understand what foods and drugs interact with any medicines that you are taking.  · If you have signs of PE or DVT, call your local emergency services (911 in the [place]  This information is not intended to replace advice given to you by your health care provider. Make sure you discuss any questions you have with your health care provider.  Document Released: 12/15/2001 Document Revised: 09/25/2019 Document Reviewed: 09/25/2019  [employer] Patient Education © 2020 [employer]      Rivaroxaban oral tablets  What is this medicine?  RIVAROXABAN (ri va NAHUM a ban) is an anticoagulant (blood thinner). It is used to treat blood clots in the lungs or in the veins. It is also used to prevent blood clots in the lungs or in the veins. It is also used to lower the chance of stroke in people with a medical condition called atrial fibrillation.  This medicine may be used for other purposes; ask your health care provider or pharmacist if you have questions.  COMMON BRAND NAME(S): Xarelto, Xarelto Starter Pack  What should I tell my health care provider before I take this medicine?  They need to know if you have any of these conditions:  · antiphospholipid antibody syndrome  · artificial heart valve  · bleeding disorders  · bleeding in the brain  · blood in your stools (black or tarry stools) or if you have blood in your vomit  · history of blood clots  · history of stomach bleeding  · kidney disease  · liver disease  · low blood counts, like low white cell, platelet, or red cell counts  · recent or planned spinal or epidural procedure  · take medicines that treat or prevent blood clots  · an unusual or allergic reaction to rivaroxaban, other medicines, foods, dyes, or preservatives  · pregnant or trying to get pregnant  · breast-feeding  How should I use this medicine?  Take this medicine by mouth with a glass of water. Follow the directions on the prescription label. Take your medicine at regular intervals. Do  not take it more often than directed. Do not stop taking except on your doctor's advice. Stopping this medicine may increase your risk of a blood clot. Be sure to refill your prescription before you run out of medicine.  If you are taking this medicine after hip or knee replacement surgery, take it with or without food. If you are taking this medicine for atrial fibrillation, take it with your evening meal. If you are taking this medicine to treat blood clots, take it with food at the same time each day. If you are unable to swallow your tablet, you may crush the tablet and mix it in applesauce. Then, immediately eat the applesauce. You should eat more food right after you eat the applesauce containing the crushed tablet.  Talk to your pediatrician regarding the use of this medicine in children. Special care may be needed.  Overdosage: If you think you have taken too much of this medicine contact a poison control center or emergency room at once.  NOTE: This medicine is only for you. Do not share this medicine with others.  What if I miss a dose?  If you take your medicine once a day and miss a dose, take the missed dose as soon as you remember. If it is almost time for your next dose, take only that dose. Do not take double or extra doses.  If you take your medicine twice a day and miss a dose, take the missed dose immediately. In this instance, 2 tablets may be taken at the same time. The next day you should take 1 tablet twice a day as directed.  What may interact with this medicine?  Do not take this medicine with any of the following medications:  · defibrotide  This medicine may also interact with the following medications:  · aspirin and aspirin-like medicines  · certain antibiotics like erythromycin, azithromycin, and clarithromycin  · certain medicines for fungal infections like ketoconazole and itraconazole  · certain medicines for irregular heart beat like amiodarone, quinidine, dronedarone  · certain  medicines for seizures like carbamazepine, phenytoin  · certain medicines that treat or prevent blood clots like warfarin, enoxaparin, and dalteparin  · conivaptan  · felodipine  · indinavir  · lopinavir; ritonavir  · NSAIDS, medicines for pain and inflammation, like ibuprofen or naproxen  · ranolazine  · rifampin  · ritonavir  · SNRIs, medicines for depression, like desvenlafaxine, duloxetine, levomilnacipran, venlafaxine  · SSRIs, medicines for depression, like citalopram, escitalopram, fluoxetine, fluvoxamine, paroxetine, sertraline  · Raul's wort  · verapamil  This list may not describe all possible interactions. Give your health care provider a list of all the medicines, herbs, non-prescription drugs, or dietary supplements you use. Also tell them if you smoke, drink alcohol, or use illegal drugs. Some items may interact with your medicine.  What should I watch for while using this medicine?  Visit your healthcare professional for regular checks on your progress. You may need blood work done while you are taking this medicine. Your condition will be monitored carefully while you are receiving this medicine. It is important not to miss any appointments.  Avoid sports and activities that might cause injury while you are using this medicine. Severe falls or injuries can cause unseen bleeding. Be careful when using sharp tools or knives. Consider using an electric razor. Take special care brushing or flossing your teeth. Report any injuries, bruising, or red spots on the skin to your healthcare professional.  If you are going to need surgery or other procedure, tell your healthcare professional that you are taking this medicine.  Wear a medical ID bracelet or chain. Carry a card that describes your disease and details of your medicine and dosage times.  What side effects may I notice from receiving this medicine?  Side effects that you should report to your doctor or health care professional as soon as  possible:  · allergic reactions like skin rash, itching or hives, swelling of the face, lips, or tongue  · back pain  · redness, blistering, peeling or loosening of the skin, including inside the mouth  · signs and symptoms of bleeding such as bloody or black, tarry stools; red or dark-brown urine; spitting up blood or brown material that looks like coffee grounds; red spots on the skin; unusual bruising or bleeding from the eye, gums, or nose  · signs and symptoms of a blood clot such as chest pain; shortness of breath; pain, swelling, or warmth in the leg  · signs and symptoms of a stroke such as changes in vision; confusion; trouble speaking or understanding; severe headaches; sudden numbness or weakness of the face, arm or leg; trouble walking; dizziness; loss of coordination  Side effects that usually do not require medical attention (report to your doctor or health care professional if they continue or are bothersome):  · dizziness  · muscle pain  This list may not describe all possible side effects. Call your doctor for medical advice about side effects. You may report side effects to FDA at 1-222-FDA-9016.  Where should I keep my medicine?  Keep out of the reach of children.  Store at room temperature between 15 and 30 degrees C (59 and 86 degrees F). Throw away any unused medicine after the expiration date.  NOTE: This sheet is a summary. It may not cover all possible information. If you have questions about this medicine, talk to your doctor, pharmacist, or health care provider.  © 2020 [employer] (2020-03-16 09:45:59)      Heart Failure, Diagnosis    Heart failure is a condition in which the heart has trouble pumping blood because it has become weak or stiff. This means that the heart does not pump blood well enough for the body to stay healthy. For some people with heart failure, fluid may back up into the lungs. There may also be swelling (edema) in the lower legs. Heart failure is usually a  long-term (chronic) condition. It is important for you to take good care of yourself and follow the treatment plan from your health care provider.  What are the causes?  This condition may be caused by:  · High blood pressure (hypertension). Hypertension causes the heart muscle to work harder than normal. This makes the heart stiff or weak.  · Coronary artery disease, or CAD. CAD is the buildup of cholesterol and fat (plaque) in the arteries of the heart.  · Heart attack, also called myocardial infarction. This injures the heart muscle, making it hard for the heart to pump blood.  · Abnormal heart valves. The valves do not open and close properly, forcing the heart to pump harder to keep the blood flowing.  · Heart muscle disease (cardiomyopathy or myocarditis). This is damage to the heart muscle. It can increase the risk of heart failure.  · Lung disease. The heart works harder when the lungs are not healthy.  · Abnormal heart rhythms. These can lead to heart failure.  What increases the risk?  The risk of heart failure increases as a person ages. This condition is also more likely to develop in people who:  · Are overweight.  · Are male.  · Smoke or chew tobacco.  · Abuse alcohol or illegal drugs.  · Have taken medicines that can damage the heart, such as chemotherapy drugs.  · Have diabetes.  · Have abnormal heart rhythms.  · Have thyroid problems.  · Have low blood counts (anemia).  What are the signs or symptoms?  Symptoms of this condition include:  · Shortness of breath with activity, such as when climbing stairs.  · A cough that does not go away.  · Swelling of the feet, ankles, legs, or abdomen.  · Losing weight for no reason.  · Trouble breathing when lying flat (orthopnea).  · Waking from sleep because of the need to sit up and get more air.  · Rapid heartbeat.  · Tiredness (fatigue) and loss of energy.  · Feeling light-headed, dizzy, or close to fainting.  · Loss of appetite.  · Nausea.  · Waking up more  often during the night to urinate (nocturia).  · Confusion.  How is this diagnosed?  This condition is diagnosed based on:  · Your medical history, symptoms, and a physical exam.  · Diagnostic tests, which may include:  ? Echocardiogram.  ? Electrocardiogram (ECG).  ? Chest X-ray.  ? Blood tests.  ? Exercise stress test.  ? Radionuclide scans.  ? Cardiac catheterization and angiogram.  How is this treated?  Treatment for this condition is aimed at managing the symptoms of heart failure.  Medicines  Treatment may include medicines that:  · Help lower blood pressure by relaxing (dilating) the blood vessels. These medicines are called ACE inhibitors (angiotensin-converting enzyme) and ARBs (angiotensin receptor blockers).  · Cause the kidneys to remove salt and water from the blood through urination (diuretics).  · Improve heart muscle strength and prevent the heart from beating too fast (beta blockers).  · Increase the force of the heartbeat (digoxin).  Healthy behavior changes         Treatment may also include making healthy lifestyle changes, such as:  · Reaching and staying at a healthy weight.  · Quitting smoking or chewing tobacco.  · Eating heart-healthy foods.  · Limiting or avoiding alcohol.  · Stopping the use of illegal drugs.  · Being physically active.    Other treatments  Other treatments may include:  · Procedures to open blocked arteries or repair damaged valves.  · Placing a pacemaker to improve heart function (cardiac resynchronization therapy).  · Placing a device to treat serious abnormal heart rhythms (implantable cardioverter defibrillator, or ICD).  · Placing a device to improve the pumping ability of the heart (left ventricular assist device, or LVAD).  · Receiving a healthy heart from a donor (heart transplant). This is done when other treatments have not helped.  Follow these instructions at home:  · Manage other health conditions as told by your health care provider. These may include  hypertension, diabetes, thyroid disease, or abnormal heart rhythms.  · Get ongoing education and support as needed. Learn as much as you can about heart failure.  · Keep all follow-up visits as told by your health care provider. This is important.  Summary  · Heart failure is a condition in which the heart has trouble pumping blood because it has become weak or stiff.  · This condition is caused by high blood pressure and other diseases of the heart and lungs.  · Symptoms of this condition include shortness of breath, tiredness (fatigue), nausea, and swelling of the feet, ankles, legs, or abdomen.  · Treatments for this condition may include medicines, lifestyle changes, and surgery.  · Manage other health conditions as told by your health care provider.  This information is not intended to replace advice given to you by your health care provider. Make sure you discuss any questions you have with your health care provider.  Document Released: 12/18/2006 Document Revised: 03/06/2020 Document Reviewed: 03/06/2020  Elsevier Patient Education © 2020 [employer]

## 2022-04-26 NOTE — DOCUMENTATION QUERY
[facility]                                                                       Query Response Note      PATIENT:               DULCE BOSTON  ACCT #:                  [id number]  MRN:                     [id number]  :                      1970  ADMIT DATE:       2022 6:44 PM  DISCH DATE:        2022 3:25 PM  RESPONDING  PROVIDER #:        [id number]           QUERY TEXT:    Pulmonary embolism is documented in the Medical Record with accompanied findings of decompensated right heart failure, pulmonary hypertension, and dilated right ventricle.  Can the diagnosis of pulmonary embolism be further specified?    The patient's clinical indicators include:  Per DC Summary: pulmonary embolism and infarcton.  TTE  showed EF 55% w/ severely dilated right ventricle and pulmonary arterial systolic blood pressure of 105 mmHg.  Decompensated heart failure , due to pulmonary embolism.  Acute respiratory failure likely due to PE and decompensated right heart failure.    NT-BNP 3470  Risk Factors: PE, pulmonary htn, dilated right ventricle, right heart failure   Treatment: cautious diuresis w/ Lasix , Xarelto, heparin, monitor I's and O's, echo     Thank you,  Akanksha Long RN, BSN  Clinical   Connect via [facility]  Options provided:   -- Pulmonary embolism with acute cor pulmonale   -- Pulmonary embolism without acute cor pulmonale   -- Other explanation, -please specify   -- Unable to determine      Query created by: Akanksha Long on 2022 10:47 AM    RESPONSE TEXT:    Pulmonary embolism with acute cor pulmonale          Electronically signed by:  AIMEE MARINO MD 2022 11:20 AM

## 2022-06-25 RX ORDER — ATORVASTATIN CALCIUM 20 MG/1
TABLET, FILM COATED ORAL
Qty: 30 TABLET | Refills: 1 | OUTPATIENT
Start: 2022-06-25